# Patient Record
Sex: MALE | Race: WHITE | NOT HISPANIC OR LATINO | Employment: UNEMPLOYED | URBAN - METROPOLITAN AREA
[De-identification: names, ages, dates, MRNs, and addresses within clinical notes are randomized per-mention and may not be internally consistent; named-entity substitution may affect disease eponyms.]

---

## 2021-03-08 ENCOUNTER — HOSPITAL ENCOUNTER (INPATIENT)
Facility: CLINIC | Age: 56
LOS: 1 days | Discharge: HOME | DRG: 897 | End: 2021-03-10
Attending: EMERGENCY MEDICINE | Admitting: PSYCHIATRY & NEUROLOGY
Payer: COMMERCIAL

## 2021-03-08 ENCOUNTER — TELEPHONE (OUTPATIENT)
Dept: BEHAVIORAL HEALTH | Facility: CLINIC | Age: 56
End: 2021-03-08

## 2021-03-08 DIAGNOSIS — Z20.822 COVID-19 RULED OUT BY LABORATORY TESTING: ICD-10-CM

## 2021-03-08 DIAGNOSIS — F10.20 UNCOMPLICATED ALCOHOL DEPENDENCE (H): ICD-10-CM

## 2021-03-08 LAB
ALBUMIN SERPL-MCNC: 4.4 G/DL (ref 3.4–5)
ALCOHOL BREATH TEST: 0 (ref 0–0.01)
ALP SERPL-CCNC: 100 U/L (ref 40–150)
ALT SERPL W P-5'-P-CCNC: 38 U/L (ref 0–70)
AMPHETAMINES UR QL SCN: NEGATIVE
ANION GAP SERPL CALCULATED.3IONS-SCNC: 7 MMOL/L (ref 3–14)
AST SERPL W P-5'-P-CCNC: 28 U/L (ref 0–45)
BARBITURATES UR QL: NEGATIVE
BASOPHILS # BLD AUTO: 0 10E9/L (ref 0–0.2)
BASOPHILS NFR BLD AUTO: 0.4 %
BENZODIAZ UR QL: NEGATIVE
BILIRUB SERPL-MCNC: 0.2 MG/DL (ref 0.2–1.3)
BUN SERPL-MCNC: 19 MG/DL (ref 7–30)
CALCIUM SERPL-MCNC: 9.1 MG/DL (ref 8.5–10.1)
CANNABINOIDS UR QL SCN: NEGATIVE
CHLORIDE SERPL-SCNC: 104 MMOL/L (ref 94–109)
CO2 SERPL-SCNC: 25 MMOL/L (ref 20–32)
COCAINE UR QL: NEGATIVE
CREAT SERPL-MCNC: 0.86 MG/DL (ref 0.66–1.25)
DIFFERENTIAL METHOD BLD: ABNORMAL
EOSINOPHIL # BLD AUTO: 0.2 10E9/L (ref 0–0.7)
EOSINOPHIL NFR BLD AUTO: 2.8 %
ERYTHROCYTE [DISTWIDTH] IN BLOOD BY AUTOMATED COUNT: 11.5 % (ref 10–15)
ETHANOL UR QL SCN: NEGATIVE
GFR SERPL CREATININE-BSD FRML MDRD: >90 ML/MIN/{1.73_M2}
GLUCOSE SERPL-MCNC: 85 MG/DL (ref 70–99)
HCT VFR BLD AUTO: 43.1 % (ref 40–53)
HGB BLD-MCNC: 14.8 G/DL (ref 13.3–17.7)
IMM GRANULOCYTES # BLD: 0 10E9/L (ref 0–0.4)
IMM GRANULOCYTES NFR BLD: 0.4 %
LABORATORY COMMENT REPORT: NORMAL
LYMPHOCYTES # BLD AUTO: 1.5 10E9/L (ref 0.8–5.3)
LYMPHOCYTES NFR BLD AUTO: 22 %
MCH RBC QN AUTO: 33.4 PG (ref 26.5–33)
MCHC RBC AUTO-ENTMCNC: 34.3 G/DL (ref 31.5–36.5)
MCV RBC AUTO: 97 FL (ref 78–100)
MONOCYTES # BLD AUTO: 0.6 10E9/L (ref 0–1.3)
MONOCYTES NFR BLD AUTO: 9 %
NEUTROPHILS # BLD AUTO: 4.4 10E9/L (ref 1.6–8.3)
NEUTROPHILS NFR BLD AUTO: 65.4 %
NRBC # BLD AUTO: 0 10*3/UL
NRBC BLD AUTO-RTO: 0 /100
OPIATES UR QL SCN: NEGATIVE
PLATELET # BLD AUTO: 242 10E9/L (ref 150–450)
POTASSIUM SERPL-SCNC: 3.8 MMOL/L (ref 3.4–5.3)
PROT SERPL-MCNC: 7.8 G/DL (ref 6.8–8.8)
RBC # BLD AUTO: 4.43 10E12/L (ref 4.4–5.9)
SARS-COV-2 RNA RESP QL NAA+PROBE: NEGATIVE
SODIUM SERPL-SCNC: 136 MMOL/L (ref 133–144)
SPECIMEN SOURCE: NORMAL
WBC # BLD AUTO: 6.7 10E9/L (ref 4–11)

## 2021-03-08 PROCEDURE — 82075 ASSAY OF BREATH ETHANOL: CPT | Performed by: EMERGENCY MEDICINE

## 2021-03-08 PROCEDURE — 250N000013 HC RX MED GY IP 250 OP 250 PS 637: Performed by: EMERGENCY MEDICINE

## 2021-03-08 PROCEDURE — 80307 DRUG TEST PRSMV CHEM ANLYZR: CPT | Performed by: EMERGENCY MEDICINE

## 2021-03-08 PROCEDURE — HZ2ZZZZ DETOXIFICATION SERVICES FOR SUBSTANCE ABUSE TREATMENT: ICD-10-PCS | Performed by: PSYCHIATRY & NEUROLOGY

## 2021-03-08 PROCEDURE — U0003 INFECTIOUS AGENT DETECTION BY NUCLEIC ACID (DNA OR RNA); SEVERE ACUTE RESPIRATORY SYNDROME CORONAVIRUS 2 (SARS-COV-2) (CORONAVIRUS DISEASE [COVID-19]), AMPLIFIED PROBE TECHNIQUE, MAKING USE OF HIGH THROUGHPUT TECHNOLOGIES AS DESCRIBED BY CMS-2020-01-R: HCPCS | Performed by: EMERGENCY MEDICINE

## 2021-03-08 PROCEDURE — C9803 HOPD COVID-19 SPEC COLLECT: HCPCS | Performed by: EMERGENCY MEDICINE

## 2021-03-08 PROCEDURE — 85025 COMPLETE CBC W/AUTO DIFF WBC: CPT | Performed by: EMERGENCY MEDICINE

## 2021-03-08 PROCEDURE — 80053 COMPREHEN METABOLIC PANEL: CPT | Performed by: EMERGENCY MEDICINE

## 2021-03-08 PROCEDURE — 80320 DRUG SCREEN QUANTALCOHOLS: CPT | Performed by: EMERGENCY MEDICINE

## 2021-03-08 PROCEDURE — 99284 EMERGENCY DEPT VISIT MOD MDM: CPT | Performed by: EMERGENCY MEDICINE

## 2021-03-08 PROCEDURE — 99285 EMERGENCY DEPT VISIT HI MDM: CPT | Mod: 25 | Performed by: EMERGENCY MEDICINE

## 2021-03-08 PROCEDURE — U0005 INFEC AGEN DETEC AMPLI PROBE: HCPCS | Performed by: EMERGENCY MEDICINE

## 2021-03-08 RX ORDER — LORAZEPAM 1 MG/1
1 TABLET ORAL 2 TIMES DAILY PRN
Status: ON HOLD | COMMUNITY
End: 2021-03-10

## 2021-03-08 RX ORDER — FOLIC ACID 1 MG/1
1 TABLET ORAL ONCE
Status: COMPLETED | OUTPATIENT
Start: 2021-03-08 | End: 2021-03-08

## 2021-03-08 RX ORDER — LORATADINE 10 MG/1
10 TABLET ORAL DAILY
COMMUNITY
End: 2023-01-11

## 2021-03-08 RX ORDER — LANOLIN ALCOHOL/MO/W.PET/CERES
100 CREAM (GRAM) TOPICAL ONCE
Status: COMPLETED | OUTPATIENT
Start: 2021-03-08 | End: 2021-03-08

## 2021-03-08 RX ORDER — MULTIVITAMIN,THERAPEUTIC
1 TABLET ORAL ONCE
Status: COMPLETED | OUTPATIENT
Start: 2021-03-08 | End: 2021-03-08

## 2021-03-08 RX ORDER — DIAZEPAM 5 MG
5 TABLET ORAL ONCE
Status: COMPLETED | OUTPATIENT
Start: 2021-03-08 | End: 2021-03-08

## 2021-03-08 RX ORDER — BUPROPION HYDROCHLORIDE 150 MG/1
150 TABLET ORAL DAILY
Status: ON HOLD | COMMUNITY
End: 2021-03-10

## 2021-03-08 RX ORDER — SERTRALINE HYDROCHLORIDE 100 MG/1
200 TABLET, FILM COATED ORAL DAILY
COMMUNITY
End: 2023-01-11

## 2021-03-08 RX ORDER — NICOTINE 21 MG/24HR
1 PATCH, TRANSDERMAL 24 HOURS TRANSDERMAL ONCE
Status: DISCONTINUED | OUTPATIENT
Start: 2021-03-08 | End: 2021-03-09 | Stop reason: DRUGHIGH

## 2021-03-08 RX ORDER — MAGNESIUM OXIDE 400 MG/1
800 TABLET ORAL ONCE
Status: COMPLETED | OUTPATIENT
Start: 2021-03-08 | End: 2021-03-08

## 2021-03-08 RX ORDER — DIAZEPAM 5 MG
5-20 TABLET ORAL EVERY 30 MIN PRN
Status: DISCONTINUED | OUTPATIENT
Start: 2021-03-08 | End: 2021-03-09

## 2021-03-08 RX ADMIN — NICOTINE 1 PATCH: 21 PATCH, EXTENDED RELEASE TRANSDERMAL at 21:53

## 2021-03-08 RX ADMIN — DIAZEPAM 5 MG: 5 TABLET ORAL at 19:07

## 2021-03-08 RX ADMIN — FOLIC ACID 1 MG: 1 TABLET ORAL at 19:07

## 2021-03-08 RX ADMIN — THERA TABS 1 TABLET: TAB at 19:07

## 2021-03-08 RX ADMIN — DIAZEPAM 5 MG: 5 TABLET ORAL at 23:02

## 2021-03-08 RX ADMIN — Medication 800 MG: at 19:07

## 2021-03-08 RX ADMIN — THIAMINE HCL TAB 100 MG 100 MG: 100 TAB at 19:07

## 2021-03-08 ASSESSMENT — ENCOUNTER SYMPTOMS
HYPERACTIVE: 1
SEIZURES: 0

## 2021-03-08 ASSESSMENT — ACTIVITIES OF DAILY LIVING (ADL)
LAUNDRY: WITH SUPERVISION
ORAL_HYGIENE: INDEPENDENT
HYGIENE/GROOMING: INDEPENDENT
DRESS: INDEPENDENT

## 2021-03-08 ASSESSMENT — MIFFLIN-ST. JEOR: SCORE: 1635.04

## 2021-03-09 PROBLEM — F10.20 UNCOMPLICATED ALCOHOL DEPENDENCE (H): Status: ACTIVE | Noted: 2021-03-09

## 2021-03-09 LAB
CHOLEST SERPL-MCNC: 207 MG/DL
GGT SERPL-CCNC: 26 U/L (ref 0–75)
HDLC SERPL-MCNC: 85 MG/DL
LDLC SERPL CALC-MCNC: 98 MG/DL
NONHDLC SERPL-MCNC: 122 MG/DL
TRIGL SERPL-MCNC: 122 MG/DL
TSH SERPL DL<=0.005 MIU/L-ACNC: 1.49 MU/L (ref 0.4–4)
VIT B12 SERPL-MCNC: 388 PG/ML (ref 193–986)

## 2021-03-09 PROCEDURE — 128N000004 HC R&B CD ADULT

## 2021-03-09 PROCEDURE — 80061 LIPID PANEL: CPT | Performed by: PSYCHIATRY & NEUROLOGY

## 2021-03-09 PROCEDURE — 36415 COLL VENOUS BLD VENIPUNCTURE: CPT | Performed by: PSYCHIATRY & NEUROLOGY

## 2021-03-09 PROCEDURE — 250N000011 HC RX IP 250 OP 636: Performed by: PSYCHIATRY & NEUROLOGY

## 2021-03-09 PROCEDURE — 82607 VITAMIN B-12: CPT | Performed by: PSYCHIATRY & NEUROLOGY

## 2021-03-09 PROCEDURE — 250N000013 HC RX MED GY IP 250 OP 250 PS 637: Performed by: PSYCHIATRY & NEUROLOGY

## 2021-03-09 PROCEDURE — 84443 ASSAY THYROID STIM HORMONE: CPT | Performed by: PSYCHIATRY & NEUROLOGY

## 2021-03-09 PROCEDURE — 99222 1ST HOSP IP/OBS MODERATE 55: CPT | Performed by: PHYSICIAN ASSISTANT

## 2021-03-09 PROCEDURE — 93005 ELECTROCARDIOGRAM TRACING: CPT

## 2021-03-09 PROCEDURE — 82977 ASSAY OF GGT: CPT | Performed by: PSYCHIATRY & NEUROLOGY

## 2021-03-09 PROCEDURE — 93010 ELECTROCARDIOGRAM REPORT: CPT | Performed by: INTERNAL MEDICINE

## 2021-03-09 PROCEDURE — 99223 1ST HOSP IP/OBS HIGH 75: CPT | Mod: AI | Performed by: PSYCHIATRY & NEUROLOGY

## 2021-03-09 PROCEDURE — 99207 PR CONSULT E&M CHANGED TO INITIAL LEVEL: CPT | Performed by: PHYSICIAN ASSISTANT

## 2021-03-09 RX ORDER — LORATADINE 10 MG/1
10 TABLET ORAL DAILY
Status: DISCONTINUED | OUTPATIENT
Start: 2021-03-09 | End: 2021-03-09

## 2021-03-09 RX ORDER — FOLIC ACID 1 MG/1
1 TABLET ORAL DAILY
Status: DISCONTINUED | OUTPATIENT
Start: 2021-03-09 | End: 2021-03-10 | Stop reason: HOSPADM

## 2021-03-09 RX ORDER — MAGNESIUM HYDROXIDE/ALUMINUM HYDROXICE/SIMETHICONE 120; 1200; 1200 MG/30ML; MG/30ML; MG/30ML
30 SUSPENSION ORAL EVERY 4 HOURS PRN
Status: DISCONTINUED | OUTPATIENT
Start: 2021-03-08 | End: 2021-03-10 | Stop reason: HOSPADM

## 2021-03-09 RX ORDER — AMOXICILLIN 250 MG
1 CAPSULE ORAL 2 TIMES DAILY PRN
Status: DISCONTINUED | OUTPATIENT
Start: 2021-03-08 | End: 2021-03-10 | Stop reason: HOSPADM

## 2021-03-09 RX ORDER — DIAZEPAM 5 MG
5-20 TABLET ORAL EVERY 30 MIN PRN
Status: DISCONTINUED | OUTPATIENT
Start: 2021-03-08 | End: 2021-03-10 | Stop reason: HOSPADM

## 2021-03-09 RX ORDER — ACETAMINOPHEN 325 MG/1
650 TABLET ORAL EVERY 4 HOURS PRN
Status: DISCONTINUED | OUTPATIENT
Start: 2021-03-08 | End: 2021-03-10 | Stop reason: HOSPADM

## 2021-03-09 RX ORDER — LOPERAMIDE HCL 2 MG
2 CAPSULE ORAL 4 TIMES DAILY PRN
Status: DISCONTINUED | OUTPATIENT
Start: 2021-03-09 | End: 2021-03-10 | Stop reason: HOSPADM

## 2021-03-09 RX ORDER — POLYETHYLENE GLYCOL 3350 17 G
2 POWDER IN PACKET (EA) ORAL
Status: DISCONTINUED | OUTPATIENT
Start: 2021-03-09 | End: 2021-03-10 | Stop reason: HOSPADM

## 2021-03-09 RX ORDER — LORATADINE 10 MG/1
10 TABLET ORAL DAILY PRN
Status: DISCONTINUED | OUTPATIENT
Start: 2021-03-09 | End: 2021-03-10 | Stop reason: HOSPADM

## 2021-03-09 RX ORDER — VITAMIN B COMPLEX
25 TABLET ORAL DAILY
Status: DISCONTINUED | OUTPATIENT
Start: 2021-03-09 | End: 2021-03-10 | Stop reason: HOSPADM

## 2021-03-09 RX ORDER — PHENOBARBITAL 32.4 MG/1
32.4 TABLET ORAL AT BEDTIME
Status: DISCONTINUED | OUTPATIENT
Start: 2021-03-09 | End: 2021-03-10 | Stop reason: HOSPADM

## 2021-03-09 RX ORDER — NICOTINE 21 MG/24HR
1 PATCH, TRANSDERMAL 24 HOURS TRANSDERMAL DAILY
Status: DISCONTINUED | OUTPATIENT
Start: 2021-03-09 | End: 2021-03-10 | Stop reason: HOSPADM

## 2021-03-09 RX ORDER — SERTRALINE HYDROCHLORIDE 100 MG/1
200 TABLET, FILM COATED ORAL DAILY
Status: DISCONTINUED | OUTPATIENT
Start: 2021-03-09 | End: 2021-03-10 | Stop reason: HOSPADM

## 2021-03-09 RX ORDER — LANOLIN ALCOHOL/MO/W.PET/CERES
100 CREAM (GRAM) TOPICAL DAILY
Status: DISCONTINUED | OUTPATIENT
Start: 2021-03-09 | End: 2021-03-10 | Stop reason: HOSPADM

## 2021-03-09 RX ORDER — IBUPROFEN 600 MG/1
600 TABLET, FILM COATED ORAL EVERY 6 HOURS PRN
Status: DISCONTINUED | OUTPATIENT
Start: 2021-03-09 | End: 2021-03-10 | Stop reason: HOSPADM

## 2021-03-09 RX ORDER — MULTIPLE VITAMINS W/ MINERALS TAB 9MG-400MCG
1 TAB ORAL DAILY
Status: DISCONTINUED | OUTPATIENT
Start: 2021-03-09 | End: 2021-03-10 | Stop reason: HOSPADM

## 2021-03-09 RX ORDER — ATENOLOL 50 MG/1
50 TABLET ORAL DAILY PRN
Status: DISCONTINUED | OUTPATIENT
Start: 2021-03-08 | End: 2021-03-10 | Stop reason: HOSPADM

## 2021-03-09 RX ORDER — HYDROXYZINE HYDROCHLORIDE 25 MG/1
25 TABLET, FILM COATED ORAL EVERY 4 HOURS PRN
Status: DISCONTINUED | OUTPATIENT
Start: 2021-03-08 | End: 2021-03-10 | Stop reason: HOSPADM

## 2021-03-09 RX ORDER — ONDANSETRON 4 MG/1
4 TABLET, ORALLY DISINTEGRATING ORAL EVERY 6 HOURS PRN
Status: DISCONTINUED | OUTPATIENT
Start: 2021-03-09 | End: 2021-03-10 | Stop reason: HOSPADM

## 2021-03-09 RX ADMIN — ACETAMINOPHEN 650 MG: 325 TABLET, FILM COATED ORAL at 22:05

## 2021-03-09 RX ADMIN — SERTRALINE HYDROCHLORIDE 200 MG: 100 TABLET ORAL at 08:47

## 2021-03-09 RX ADMIN — ONDANSETRON 4 MG: 4 TABLET, ORALLY DISINTEGRATING ORAL at 16:19

## 2021-03-09 RX ADMIN — ONDANSETRON 4 MG: 4 TABLET, ORALLY DISINTEGRATING ORAL at 08:47

## 2021-03-09 RX ADMIN — HYDROXYZINE HYDROCHLORIDE 25 MG: 25 TABLET, FILM COATED ORAL at 12:26

## 2021-03-09 RX ADMIN — DIAZEPAM 10 MG: 5 TABLET ORAL at 01:53

## 2021-03-09 RX ADMIN — NICOTINE 1 PATCH: 14 PATCH, EXTENDED RELEASE TRANSDERMAL at 08:46

## 2021-03-09 RX ADMIN — NICOTINE 1 PATCH: 14 PATCH, EXTENDED RELEASE TRANSDERMAL at 17:10

## 2021-03-09 RX ADMIN — HYDROXYZINE HYDROCHLORIDE 25 MG: 25 TABLET, FILM COATED ORAL at 22:05

## 2021-03-09 RX ADMIN — ACETAMINOPHEN 650 MG: 325 TABLET, FILM COATED ORAL at 12:26

## 2021-03-09 ASSESSMENT — ACTIVITIES OF DAILY LIVING (ADL)
HYGIENE/GROOMING: INDEPENDENT
LAUNDRY: WITH SUPERVISION
ORAL_HYGIENE: INDEPENDENT
LAUNDRY: WITH SUPERVISION
HYGIENE/GROOMING: INDEPENDENT
DRESS: INDEPENDENT
ORAL_HYGIENE: INDEPENDENT
DRESS: INDEPENDENT

## 2021-03-09 NOTE — PLAN OF CARE
Late entry for [id number] shift on 3-8-2021  [facility]      Gus Brown is a 56 year old year old male with a chief complaint of Alcohol problem      S = Situation:   Patient is a voluntary admission seeking alcohol detox      B  = Background:   Patient admitted from the ER seeking alcohol detox. Patient stated he was drinking on average 10 beers daily for the past 2 years.Last drink was 3/7/21. Patient also takes prescribed Lorazepam 1 mg BID prn. Patient usually takes this at night for sleep, last taken 3/7/21. Patient breathalyzer 0 and urine drug screen negative for any other substances. Patient has never been through alcohol detox, and denies any history of seizures or DT's. Patient has never been hospitalized for mental health reasons. Patient reports a past medical history of IBS, and sensitivity to gluten and milk (not other dairy products), depression and anxiety. Patient also reports his pulse generally runs low.     A  =  Assessment:   Patient affect flat, mood is calm. Patient denies and SI, SIB, HI, or hallucinations. Patient reports stressors leading to drinking more alcohol include a divorce 2 years ago, and isolation due to COVID. 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 Patient identifies his Taoism practice as a positive and misses meeting with his Taoism friends. Patient also states his daughter is a source of support, as well as his family in [place]. Patient also reports sleep problems and reports melatonin and trazodone have both been unhelpful. MSSA 8 during admission assessment. Patient reports he is interested in OP CD treatment.    R =   Request or Recommendation:   Patient on MSSA monitoring with Valium for alcohol withdrawal. Patient to be assessed in the am by psychiatrist, internal medicine, and case management.

## 2021-03-09 NOTE — H&P
Gus Brown is a 56 year old male who was referred by self      History was provided by PATEINT who was a good historian.   CHIEF COMPLAINT:  Drinking everyday     HISTORY OF PRESENT ILLNESS:      Patient is a 56-year-old  male came to the emergency room wanting detox from alcohol and Ativan.  Daily drinking for 2 yrs   Patient has been using the following substances: Alcohol  Started at age15 , became daily by 2019. He states that he gets jittery and withdrawal symptoms when he stops drinking and states he pretty much drinks around the clock.  He drinks 10 beers a day .  He expericens crawling feeling , tremor,sweaty , agitated when he stops drinking    Patient has tolerance, withdrawal, progressive use, loss of control, spending more time and more amount than intended. Patient has made attempts to quit, is experiencing cravings, and reports negative consequences.  Patient does not have a history of seizures.  Patient does not have a history of delirium tremens.       He was prescribed ativan 2yrs ago for anxiety 0.5-1mg as prescribed for anxiety he has been drinking on it        Denies thoughts of suicide or harming others.      Denies auditory or visual hallucinations.     Patient smokes1/2 ppd    Patient denied any gambling    Substance Age first use First became regular or problematic Most recent use   Alcohol         Cannabis  occasionaly    Cocaine NONE       Stimulants NONE       Opioids NONE       Sedatives NONE       Hallucinogens NONE       Inhalants NONE       Other         OTC drugs NONE       Nicotine           PSYCHIATRIC REVIEW OF SYSTEMS:         Psychiatric Review of Systems:   Depression:   H/o depression   Denies: depressed mood, suicidal ideation, decreased interest, changes in sleep, changes in appetite, guilt, hopelessness, helplessness, impaired concentration, decreased energy, irritability.  Rita:   Denied  sleeplessness, impulsiveness, racing thoughts, increased  goal-directed activities, pressured speech, increase in energy  Rita Feeling euphoric,Distractible,Impulsive,Grandiose,Talking excessively,Have energy without sleeping,Mood swings,Irritability  Denies: sleeplessness, increased goal-directed activities, abrupt increase in energy, pressured speech  Psychosis:     Denies: visual hallucinations, auditory hallucinations, paranoia  Anxiety: c/o restless, crawling sensation  C/o palpitation agitation wound up feeling , tingly , resltess         PTSD:     Denies: re-experiencing past trauma, nightmares, increased arousal, avoidance of traumatic stimuli, impaired function.  OCD:     Denies: obsessions, checking, symmetry, cleaning, skin picking.  ED:     Denies: restriction, binging, purging.    H/o add, dificulty  finisihing thing   staying on task                   PSYCHIATRIC HISTORY   Was in therapy for depression             Past court commitments: none  SIB /SUICIDE ATTEMPTS NONE  Psych Hosp :none  Outpatient Programs none  Inpatient cd trt none  Out pt cd trt none    PAST PSYCH MED TRIALS   celexa  wellbutrin       SOCIAL HISTORY                                                                         Unemployed , single, 2 kids         Family History:   Unknown family psychiatric or chemical dependency issues             PTA Medications:     Medications Prior to Admission   Medication Sig Dispense Refill Last Dose     buPROPion (WELLBUTRIN XL) 150 MG 24 hr tablet Take 150 mg by mouth daily   3/8/2021 at Unknown time     cholecalciferol (VITAMIN D3) 125 mcg (5000 units) capsule Take 125 mcg by mouth daily   3/8/2021 at Unknown time     loratadine (CLARITIN) 10 MG tablet Take 10 mg by mouth daily   3/8/2021 at Unknown time     LORazepam (ATIVAN) 1 MG tablet Take 1 mg by mouth 2 times daily as needed for anxiety or sleep   3/8/2021 at Unknown time     Omega-3 Fatty Acids (FISH OIL OMEGA-3 PO) Take 1 capsule by mouth daily   3/8/2021 at Unknown time     Probiotic Product  "(PROBIOTIC DAILY PO) Take 1 capsule by mouth daily   3/8/2021 at Unknown time     S-Adenosylmethionine (JJ-E PO) Take 1 tablet by mouth daily   3/8/2021 at Unknown time     sertraline (ZOLOFT) 100 MG tablet Take 200 mg by mouth daily   3/8/2021 at Unknown time     THIAMINE HCL PO Take 1 tablet by mouth daily   3/8/2021 at Unknown time     VITAMIN D PO Take 1 tablet by mouth daily   3/8/2021 at Unknown time          Allergies:     Allergies   Allergen Reactions     Gluten Meal GI Disturbance     Dx of IBS; pt states \"gets bloated\"     Milk Protein Extract      Wheat Extract           Labs:     Recent Results (from the past 48 hour(s))   Alcohol breath test POCT    Collection Time: 03/08/21  4:26 PM   Result Value Ref Range    Alcohol Breath Test 0.000 0.00 - 0.01   CBC with platelets differential    Collection Time: 03/08/21  6:01 PM   Result Value Ref Range    WBC 6.7 4.0 - 11.0 10e9/L    RBC Count 4.43 4.4 - 5.9 10e12/L    Hemoglobin 14.8 13.3 - 17.7 g/dL    Hematocrit 43.1 40.0 - 53.0 %    MCV 97 78 - 100 fl    MCH 33.4 (H) 26.5 - 33.0 pg    MCHC 34.3 31.5 - 36.5 g/dL    RDW 11.5 10.0 - 15.0 %    Platelet Count 242 150 - 450 10e9/L    Diff Method Automated Method     % Neutrophils 65.4 %    % Lymphocytes 22.0 %    % Monocytes 9.0 %    % Eosinophils 2.8 %    % Basophils 0.4 %    % Immature Granulocytes 0.4 %    Nucleated RBCs 0 0 /100    Absolute Neutrophil 4.4 1.6 - 8.3 10e9/L    Absolute Lymphocytes 1.5 0.8 - 5.3 10e9/L    Absolute Monocytes 0.6 0.0 - 1.3 10e9/L    Absolute Eosinophils 0.2 0.0 - 0.7 10e9/L    Absolute Basophils 0.0 0.0 - 0.2 10e9/L    Abs Immature Granulocytes 0.0 0 - 0.4 10e9/L    Absolute Nucleated RBC 0.0    Comprehensive metabolic panel    Collection Time: 03/08/21  6:01 PM   Result Value Ref Range    Sodium 136 133 - 144 mmol/L    Potassium 3.8 3.4 - 5.3 mmol/L    Chloride 104 94 - 109 mmol/L    Carbon Dioxide 25 20 - 32 mmol/L    Anion Gap 7 3 - 14 mmol/L    Glucose 85 70 - 99 mg/dL    " "Urea Nitrogen 19 7 - 30 mg/dL    Creatinine 0.86 0.66 - 1.25 mg/dL    GFR Estimate >90 >60 mL/min/[1.73_m2]    GFR Estimate If Black >90 >60 mL/min/[1.73_m2]    Calcium 9.1 8.5 - 10.1 mg/dL    Bilirubin Total 0.2 0.2 - 1.3 mg/dL    Albumin 4.4 3.4 - 5.0 g/dL    Protein Total 7.8 6.8 - 8.8 g/dL    Alkaline Phosphatase 100 40 - 150 U/L    ALT 38 0 - 70 U/L    AST 28 0 - 45 U/L   Asymptomatic SARS-CoV-2 COVID-19 Virus (Coronavirus) by PCR    Collection Time: 03/08/21  6:01 PM    Specimen: Nasopharyngeal   Result Value Ref Range    SARS-CoV-2 Virus Specimen Source Nasopharyngeal     SARS-CoV-2 PCR Result NEGATIVE     SARS-CoV-2 PCR Comment       Testing was performed using the EmSenseert Xpress SARS-CoV-2 Assay on the Cepheid Gene-Xpert   Instrument Systems. Additional information about this Emergency Use Authorization (EUA)   assay can be found via the Lab Guide.     Drug abuse screen 6 urine (tox)    Collection Time: 03/08/21  6:06 PM   Result Value Ref Range    Amphetamine Qual Urine Negative NEG^Negative    Barbiturates Qual Urine Negative NEG^Negative    Benzodiazepine Qual Urine Negative NEG^Negative    Cannabinoids Qual Urine Negative NEG^Negative    Cocaine Qual Urine Negative NEG^Negative    Ethanol Qual Urine Negative NEG^Negative    Opiates Qualitative Urine Negative NEG^Negative   GGT    Collection Time: 03/09/21  6:33 AM   Result Value Ref Range    GGT 26 0 - 75 U/L   Lipid panel    Collection Time: 03/09/21  6:33 AM   Result Value Ref Range    Cholesterol 207 (H) <200 mg/dL    Triglycerides 122 <150 mg/dL    HDL Cholesterol 85 >39 mg/dL    LDL Cholesterol Calculated 98 <100 mg/dL    Non HDL Cholesterol 122 <130 mg/dL   TSH with free T4 reflex and/or T3 as indicated    Collection Time: 03/09/21  6:33 AM   Result Value Ref Range    TSH 1.49 0.40 - 4.00 mU/L         /76   Pulse 53   Temp 97.3  F (36.3  C) (Temporal)   Resp 16   Ht 1.778 m (5' 10\")   Wt 79.4 kg (175 lb)   SpO2 97%   BMI 25.11 kg/m  "   Weight is 175 lbs 0 oz  Body mass index is 25.11 kg/m .    Physical Exam:     ROS: 10 point ROS neg other than the symptoms noted above in the HPI.            Past Medical History:   PAST MEDICAL HISTORY: No past medical history on file.    PAST SURGICAL HISTORY: No past surgical history on file.    -    -           MENTAL STATUS EXAM:      Constitutional: General appearance of patient:      Appearance:  awake, alert, appeared as age stated, adequate groomed and slightly unkempt  Attitude:  cooperative  Eye Contact:  good  Mood:  anxious   Affect:  congruent   Speech:  clear, coherent normal rate   Psychomotor Behavior:  no evidence of tardive dyskinesia, dystonia, or tics  Thought Process:  logical, linear and goal oriented  Associations:  no loose associations  Thought Content:  no evidence of psychotic thought and active suicidal ideation present  Denied any active suicidal /homicidation ideation plan intent   Insight:  fair  Judgment:  fair  Oriented to:  time, person, and place  Attention Span and Concentration:  intact  Recent and Remote Memory:  intact  Language:  english with appropriate syntax and vocabulary  Fund of Knowledge: appropriate  Muscle Strength and Tone: normal  Gait and Station: Normal     There are no abnormal or psychotic thoughts, no preoccupations, no overvalued ideas, no rumination, no obsessions, no compulsions, no somatic concerns, no hypochrondriasis, no ideas of reference, and no delusions.  Patient denies homicidal thoughts.   Patient denies suicidal thoughts.  Patient appears to have good judgment and good insight.     Musculoskeletal: Patient shows no abnormalities of motor activity: there is no tremor, no tic, and no dystonia.  There is no apparent muscle atrophy, strength and tone appear normal, and there are no abnormal movements.  Patient has normal gait and stance.    DISCUSSION:         Assessment:   Inpatient psychiatric hospitalization is warranted at this time for safety,  stabilization, and possible adjustment in medications.      Psychologically patient is experiencing alcohol use disorder with tolerance withdrawal progress loss of control  Patient has these particular stressors work lack sober support  Patient has chronic illness exacerbation leading to hospitalization progression as described.     Patient has been unable to stop using drugs in the community due to both physical and psychological symptoms.  Continued use will put the patient at risk for medical and/or psychiatric complications.       Diagnoses:    Alcohol use disorder severe alcohol withdrawal severe  Alcohol dependence          Plan:   Problem list  1#alcohol use disorder severe alcohol withdrawal severe alcohol dependence     - [facility] protocol using Valium for management of alcohol withdrawal    - Continue thiamine, folate, and multivitamin daily  Patient has tremor agitation sweats he is required 20 mg of diazepam since his admission  2#patient's U tox is negative for benzodiazepines will have a small dose of phenobarbital to detox from Ativan    3#patient takes Wellbutrin will be held because it increases the risk of seizures we will restart once patient is out of detox  4#patient will continue Zoloft for his depression  5#.  Nicotine replacement  Pt interested in Dual Day OP program.  consult  ordered.  - Consider anti-craving medications prior to discharge. Pt willing to review additional information about both naltrexone and Antabuse.    Alcohol withdrawal nausea prn Zofran as needed for nausea     hydroxyzine 25 mg q4h prn for acute anxiety  Trazodone 50 mg at bedtime prn for sleep disturbances       Patient has been unable to stop using drugs in the community due to both physical and psychological symptoms.  Continued use will put the patient at risk for medical and/or psychiatric complications.    I HAVE REVIEWED LABS WITH PT AND TALKED ABOUT RESULTS WITH PT  I HAVE REVIEWED AND SUMMARIZED OLD RECORDS  including his medication reconcilation of his home medications  and PDMP   I HAVE SPOKEN WITH RN ABOUT MEDICATIONS AND DETOX SCORES  I HAVE SPOKEN WITH CM ABOUT PTS TREATMETN OPTIONS   Laboratory/Imaging:    Liver Function Studies -   Recent Labs   Lab Test 03/08/21  1801   PROTTOTAL 7.8   ALBUMIN 4.4   BILITOTAL 0.2   ALKPHOS 100   AST 28   ALT 38      Last Comprehensive Metabolic Panel:  Sodium   Date Value Ref Range Status   03/08/2021 136 133 - 144 mmol/L Final     Potassium   Date Value Ref Range Status   03/08/2021 3.8 3.4 - 5.3 mmol/L Final     Chloride   Date Value Ref Range Status   03/08/2021 104 94 - 109 mmol/L Final     Carbon Dioxide   Date Value Ref Range Status   03/08/2021 25 20 - 32 mmol/L Final     Anion Gap   Date Value Ref Range Status   03/08/2021 7 3 - 14 mmol/L Final     Glucose   Date Value Ref Range Status   03/08/2021 85 70 - 99 mg/dL Final     Urea Nitrogen   Date Value Ref Range Status   03/08/2021 19 7 - 30 mg/dL Final     Creatinine   Date Value Ref Range Status   03/08/2021 0.86 0.66 - 1.25 mg/dL Final     GFR Estimate   Date Value Ref Range Status   03/08/2021 >90 >60 mL/min/[1.73_m2] Final     Comment:     Non  GFR Calc  Starting 12/18/2018, serum creatinine based estimated GFR (eGFR) will be   calculated using the Chronic Kidney Disease Epidemiology Collaboration   (CKD-EPI) equation.       Calcium   Date Value Ref Range Status   03/08/2021 9.1 8.5 - 10.1 mg/dL Final     Bilirubin Total   Date Value Ref Range Status   03/08/2021 0.2 0.2 - 1.3 mg/dL Final     Alkaline Phosphatase   Date Value Ref Range Status   03/08/2021 100 40 - 150 U/L Final     ALT   Date Value Ref Range Status   03/08/2021 38 0 - 70 U/L Final     AST   Date Value Ref Range Status   03/08/2021 28 0 - 45 U/L Final                   Medical treatment/interventions:  Medical concerns: As above    - Consults: IM consult placed. Appreciate assistance.     Legal Status: Voluntary     Safety Assessment:  "  Checks: Status 15  Pt has not required locked seclusion or restraints in the past 24 hours to maintain safety, please refer to RN documentation for further details.    The risks, benefits, alternatives and side effects have been discussed and are understood by the patient.       Patient will be treated in therapeutic milieu with appropriate individual and group therapies as described.  Disposition: Pending clinical stabilization. Pt does  appear interested in        \"Much or all of the text in this note was generated through the use of Dragon Dictate voice to text software. Errors in spelling or words which appear to be out of contact are unintentional, may be present due having escaped editing\"     "

## 2021-03-09 NOTE — PROGRESS NOTES
Met with pt to initiate discharge planning.  Pt is requesting a referral to OP treatment.  Coached Pt to complete paperwork for assessment and referral.  Pt acknowledged. Pt has [facility].

## 2021-03-09 NOTE — PROGRESS NOTES
PDMP as of 3/9/2021:     Per donita Brown   Risk Indicators   NARX SCORES  Narcotic  140  Sedative  341  Stimulant  110  Explanation and Guidance  OVERDOSE RISK SCORE     170    (Range 000-999)  Explanation and Guidance  ADDITIONAL RISK INDICATORS ( 0 )     Explanation and Guidance   This [facility] report is based on search criteria supplied and the data entered by the dispensing pharmacy. For more information about any prescription, please contact the dispensing pharmacy or the prescriber. NarxCare scores and reports are intended to aid, not replace, medical decision making. None of the information presented should be used as sole justification for providing or refusing to provide medications. The information on this report is not warranted as accurate or complete.   Graphs   RX GRAPH  Narcotic Buprenorphine Sedative Stimulant Other     Prescribers    1 - Bertin Blairan    Timeline  03/09  2m  6m  1y  2y    Buprenorphine mg    16    4    0  28    Timeline  03/09  2m  6m  1y  2y  Morphine MgEq (MME)    200    80    0  320    Timeline  03/09  2m  6m  1y  2y  Lorazepam MgEq (LME)    10    2    0  18    Timeline  03/09  2m  6m  1y  2y    *Per CDC guidance, the MME conversion factors prescribed or provided as part of the medication-assisted treatment for opioid use disorder should not be used to benchmark against dosage thresholds meant for opioids prescribed for pain. Buprenorphine products have no agreed upon morphine equivalency, and as partial opioid agonists, are not expected to be associated with overdose risk in the same dose-dependent manner as doses for full agonist opioids. MME = morphine milligram equivalents. LME = Lorazepam milligram equivalents. mg = dose in milligrams.     Summary     Summary  Total Prescriptions:     19   Total Prescribers:     1   Total Pharmacies:     3   Narcotics* (excluding Buprenorphine)  Current Qty:     0   Current MME/day:     0.00  30 Day Avg MME/day:     0.00    Sedatives*  Current Qty:     18   Current LME/day:    2.00  30 Day Avg LME/day:    2.07  Buprenorphine*  Current Qty:     0   Current mg/day:    0.00  30 Day Avg mg/day:    0.00  Rx Data   PRESCRIPTIONS  Total Prescriptions:  19  Total Private Pay:  0  Fill Date  ID  Written  Sold  Drug  Qty  Days  Prescriber  Rx #  Pharmacy  Refill  Daily Dose *  Pymt Type    02/22/2021   1   01/07/2021 02/28/2021   Lorazepam 1 Mg Tablet   50.00  25  Ge Bal   [id number]   Sup (1172)   0/2  2.00 LME  Comm [facility]  01/29/2021   1   10/27/2020   02/01/2021   Lorazepam 1 Mg Tablet   50.00  25  Ge Bal   [id number]   Sup (1172)   2/2  2.00 LME  Comm [facility]  01/01/2021   1   10/27/2020   01/08/2021   Lorazepam 1 Mg Tablet   50.00  25  Ge Bal   [id number]   Sup (1172)   1/2  2.00 LME  Comm [facility]  12/09/2020   1   10/27/2020   12/11/2020   Lorazepam 1 Mg Tablet   50.00  25  Ge Bal   [id number]   Sup (1172)   0/2  2.00 LME  [place]  11/15/2020   1   09/28/2020 11/23/2020   Lorazepam 1 Mg Tablet   40.00  20  Ge Bal   [id number]   Sup (1172)   2/2  2.00 LME  Comm [facility]  10/19/2020   1   09/28/2020   10/30/2020   Lorazepam 1 Mg Tablet   40.00  20  Ge Bal   [id number]   Sup (1172)   1/2  2.00 LME  Comm [facility]  09/28/2020   1   09/28/2020   10/03/2020   Lorazepam 1 Mg Tablet   40.00  20  Ge Bal   [id number]   Sup (1172)   0/2  2.00 LME  Comm [facility]  09/03/2020   1   09/03/2020 09/04/2020   Vyvanse 20 Mg Capsule   30.00  30  Ge Bal   [id number]   Sup (1172)   0/0    Comm [facility]  08/04/2020   2   08/04/2020     Vyvanse 40 Mg Capsule   30.00  30  Ge Bal   [id number]   Gra (7941)   0/0    [place]  08/03/[place]   2   08/03/2020     Lorazepam 1 Mg Tablet   40.00  30  Ge Bal   [id number]   Gra (7941)   0/2  1.33 LME  [place]  07/06/2020   2   04/29/2020     Adderall Xr 30 Mg Capsule   30.00  30  Ge Bal   [id number]   Gra (7941)   0/0    [place]  06/29/2020   2   04/29/2020     Lorazepam 1 Mg Tablet   40.00  20  Ge Bal   [id number]   Gra  (7941)   2/2  2.00 LME  [place]  06/02/2020   2   04/29/2020     Lorazepam 1 Mg Tablet   40.00  20  Ge Bal   [id number]   Gra (7941)   1/2  2.00 LME  [place]  06/02/2020   2   04/29/2020     Adderall Xr 30 Mg Capsule   30.00  30  Ge Bal   [id number]   Gra (7941)   0/0    [place]  04/29/2020   2   04/29/2020     Lorazepam 1 Mg Tablet   40.00  20  Ge Bal   [id number]   Gra (7941)   0/2  2.00 LME  [place]  04/29/[place]   2   04/29/2020     Adderall Xr 30 Mg Capsule   30.00  30  Ge Bal   [id number]   Gra (7941)   0/0    [place]  03/18/2020   2   03/18/2020     Lorazepam 1 Mg Tablet   75.00  25  Ge Bal   [id number]   Gra (8599)   0/0  3.00 LME  [place]  03/18/[place]   2   03/18/2020     Eszopiclone 2 Mg Tablet   30.00  30  Ge Bal   [id number]   Gra (8599)   0/0  0.66 LME  [place]  03/18/2020   2   03/18/2020     Adderall Xr 30 Mg Capsule   30.00  30  Ge Bal   [id number]   Gra (8599)   0/0    [place]  *Per CDC guidance, the MME conversion factors prescribed or provided as part of the medication-assisted treatment for opioid use disorder should not be used to benchmark against dosage thresholds meant for opioids prescribed for pain. Buprenorphine products have no agreed upon morphine equivalency, and as partial opioid agonists, are not expected to be associated with overdose risk in the same dose-dependent manner as doses for full agonist opioids. MME = morphine milligram equivalents. LME = Lorazepam milligram equivalents. mg = dose in milligrams.     Providers  Total Providers: 1   Name   Address   City   State   Zipcode   Phone   Bertin Jung  [address]   [place]  [address] [phone]  Pharmacies  Total Pharmacies: 3   Name   Address   City   State   Zipcode   Phone   [facility] [phone]   [facility]  [address] [phone]  [facility] [phone]) [address] [place]  [place]  [address]  [phone]  [facility] [phone]  [address]  [phone]    The report provided is based upon the search criteria entered and the corresponding data as it has been reported by dispenser(s). If erroneous information is identified or additional information is needed, please contact the dispenser or the prescriber provided on the report. Date Sold signifies the date the prescription was sold (left the pharmacy). The absence of Date Sold does not necessarily indicate the prescription was not dispensed. Fill Date represents the date the medication was filled or prepared by the pharmacy. Note, federal regulation (CFR Title 42: Part 2) requires patient consent prior to releasing certain patient data from federally funded opioid treatment programs (OTPs). As such, controlled substances dispensed from OTPs for medication-assisted treatment may not appear in the [place]  report. Morphine milligram equivalent (MME) conversion factors published by the CDC are used in the MME calculation. Per the CDC, the MME conversion factor is intended only for analytic purposes where prescription data are used to retrospectively calculate daily MME to inform analyses of risks associated with opioid prescribing. This value does not constitute clinical guidance or recommendations for converting patients from one form of opioid analgesic to another. Per the CDC, the conversion factors for drugs prescribed or provided, as part of medication-assisted treatment for opioid use disorder should not be used to benchmark against MME dosage thresholds meant for opioids prescribed for pain. Buprenorphine products listed in the CDC s MME file do not have an associated conversion factor. Lastly, the CDC notes, in clinical practice, calculating MME for methadone often involves a sliding-scale approach, whereby the conversion factor increases with increasing dose. The conversion factor of 3 for methadone presented in this file could  underestimate MME for a given patient. This report contains confidential information, including patient identifiers, and is not a public record. The information on this report must be treated as protected health information and is only to be disclosed to others as authorized by applicable state and Federal regulations.           Powered By       [place] Prescription Monitoring Program  [place] Board of [employer]  [address] [place] [address] [address]  [place] [address]  [phone]     [employer] 2021. All Rights Reserved. Privacy Policy

## 2021-03-09 NOTE — CONSULTS
3/9/21  The writer spoke with the patient about treatment options. The writer discussed the details of the Dual Disorder Program with the patient. Patient reported he was not aware he would not be able to be on prescribed Ativan during the program and that the program was during the day. Patient reported he would like to continue with his prescribed Ativan as this is the only medication he has found to be helpful with his sleep. Patient reported he would also like to be able to work during the day. Patient reported he would have most interest in an evening IOP treatment program at this time. The writer recommended he fill out the paperwork that was provided by the unit  FAYE Thomas to complete a AUSTYN assessment. The writer told the patient she would follow up with the unit  to let her know that is what he would like to pursue. The writer answered all the patient's questions regarding treatment programs. Patient verbalized understanding and said he would do the paperwork that was provided to him and that he would also follow up with the  about options/resources.The writer will the close consult at this time as patient has interest in evening IOP treatment. If patient's desire changes please re-order consult at that time.       Crista Keene, MAGY, LADC

## 2021-03-09 NOTE — CONSULTS
Internal Medicine Initial Visit      Gus Brown MRN# [id number]   YOB: 1965 Age: 56 year old   Date of Admission: 3/8/2021  PCP: Giorgio Perry    Referring Provider: Behavioral Health - Phil Wang MD  Reason for Visit: General Medical Evaluation          Assessment and Recommendations:   Gus Brown is a 56 year old man with a history of alcohol use, benzodiazepine abuse, depression, anxiety, seasonal allergies and vitamin D deficiency who is admitted to station 3A with alcohol withdrawal and benzodiazepine use Internal Medicine consultation was ordered by Dr. Marks for general medical evaluation.       # Alcohol use disorder with acute withdrawal:  Patient has been drinking 10 beers +/- wine per day for the past 2-3 years. Denies history of ANTHONY or DTs. Last drink was midnight on 3/8. MSSA scores 1-8 thus far. Utox negative. Currently patient reports feeling anxiety and nausea which he attributes to his withdrawal symptoms.   - Management per psychiatry team  - Agree with MVI, folic acid and thiamine supplementation     # Benzodiazepine use:  Has been using Lorazepam 0.5-1mg daily for the past 2 years. Also drinking alcohol daily as above. Seeking treatment given his concern for addiction. He has been using his benzodiazepine for sleep predominantly. Follows with Psychiatry Dr. Mercedes as OP.   - Management per psychiatry team    # Elevated cholesterol:  Cholesterol level 207 this AM, otherwise lipid panel with HDL 85, LDL 98, Triglycerides 122.  - Recommend follow up with PCP within 7 days for repeat fasting lipid panel  - Lifestyle modificiations    # Vit D deficiency:  - Continue PTA cholecalciferol 125mcg daily    # Seasonal allergies:  - Continue PTA Loratadine 10mg daily PRN (reports he does not take this regularly)     # Depression:  On PTA Zoloft 200mg daily and Wellbutrin 150mg daily. Denies SI/HI.   - Management per psychiatry team    # Tobacco use  #  Cannabis use:  Smokes 0.5ppd. Smoked off and on since age 22yo. Also uses smokeless tobacco and marijuana occasionally. Denies any other illicit drug use.  - Encouraged cessation  - Nicotine supplementation per psychiatry team    # History of ulcerative proctitis:  Diagnosed on colonoscopy in 2014. Follows with Dr. Rausch, GI at [facility]. Last seen 2019. He reports that his symptoms are now controlled and he takes JJ-E supplementation daily. He denies any abdominal pain, diarrhea, constipation, melena or hematochezia.   - Follow-up with Gastroenterology as outpatient as needed  - Follow up with PCP as outpatient     # Asymptomatic bradycardia:  Reports his HR generally run 50-60s.  HR currently 45-53bpm. ECG obtained showing sinus bradycardia without evidence of heart block with HR 44bpm. He is asymptomatic and denies dizziness, lightheadedness, chest pain or SOB.   - Monitor HR - please notify medicine if symptomatic including dizziness, lightheadedness, chest pain or SOB or presyncope      Medicine will monitor HR peripherally, please page with any additional concerns.     Madeleine Payan PA-C  Hospitalist Service   Pager: [phone]  7a-6p M-F and 7a-3p weekends/holidays  Otherwise page job code [phone] (), [phone] (3A), or [phone] ([facility] and )  Text paging via [employer] is appreciated        Reason for Admission:   Alcohol withdrawal and benzodiazepine use         History of Present Illness:   History is obtained from the patient and medical record.     Gus Brown is a 56 year old man with a history of alcohol use, benzodiazepine abuse, seasonal allergies and vitamin D deficiency who is admitted to station 3A with alcohol withdrawal and benzodiazepine withdrawal. Internal Medicine consultation was ordered by Dr. Marks for general medical evaluation.    Patient reports drinking 10 beers plus or minus wine daily for the past ~2 years. He states during this time  "he has gotten  and has been unemployed (previously a  in schools). He denies any history of ANTHONY or DTs. Last drink midnight 3/8. He reports current anxiety and nausea which he attributes to his withdrawal. He had Valium last evening and he states that he felt, \"groggy,\" from it.    He also has been taking Ativan 0.5 to 1mg daily for the last few years prescribed by his psychiatrist. He notes he takes it before bed for sleep. He will occasionally take another dose for anxiety but gets 40 tablets per month so he normally doesn't take more than one per day.  He is also on Zoloft and Wellbutrin for depression. He denies any SI/HI. He does state he is \"scared,\" about his future if he doesn't get help with his alcohol use.     He smokes 0.5 ppd of cigarettes/day and has smoked off and on since his mid 20s. He also uses smokeless tobacco and marijuana occasionally. Denies any other illicit drug use.    He denies any chest pain, SOB, abdominal pain, diarrhea, constipation, melena, hematochezia, dysuria, frequency, dizziness or lightheadedness, urgency, fever, chills, chest pain or SOB. No paresthesias. He states he normally has HR in 50s-60s.         Review of Systems:    ROS: 10 point ROS neg other than the symptoms noted above in the HPI.            Past Medical History:   Reviewed and updated in Epic.  No past medical history on file.          Past Surgical History:   Reviewed and updated in Epic.  No past surgical history on file.          Social History:   L[place] an [place] in the [facility]. Currently unemployed. . Has children.   Social History     Tobacco Use     Smoking status: Not on file   Substance Use Topics     Alcohol use: Not on file     Drug use: Not on file             Family History:   Reviewed and updated in Epic.  No family history on file.          Allergies:     Allergies   Allergen Reactions     Gluten Meal GI Disturbance     Dx of IBS; pt states " "\"gets bloated\"     Milk Protein Extract      Wheat Extract              Medications:     Medications Prior to Admission   Medication Sig Dispense Refill Last Dose     buPROPion (WELLBUTRIN XL) 150 MG 24 hr tablet Take 150 mg by mouth daily   3/8/2021 at Unknown time     cholecalciferol (VITAMIN D3) 125 mcg (5000 units) capsule Take 125 mcg by mouth daily   3/8/2021 at Unknown time     loratadine (CLARITIN) 10 MG tablet Take 10 mg by mouth daily   3/8/2021 at Unknown time     LORazepam (ATIVAN) 1 MG tablet Take 1 mg by mouth 2 times daily as needed for anxiety or sleep   3/8/2021 at Unknown time     Omega-3 Fatty Acids (FISH OIL OMEGA-3 PO) Take 1 capsule by mouth daily   3/8/2021 at Unknown time     Probiotic Product (PROBIOTIC DAILY PO) Take 1 capsule by mouth daily   3/8/2021 at Unknown time     S-Adenosylmethionine (JJ-E PO) Take 1 tablet by mouth daily   3/8/2021 at Unknown time     sertraline (ZOLOFT) 100 MG tablet Take 200 mg by mouth daily   3/8/2021 at Unknown time     THIAMINE HCL PO Take 1 tablet by mouth daily   3/8/2021 at Unknown time     VITAMIN D PO Take 1 tablet by mouth daily   3/8/2021 at Unknown time        Current Facility-Administered Medications   Medication     acetaminophen (TYLENOL) tablet 650 mg     alum & mag hydroxide-simethicone (MAALOX) suspension 30 mL     atenolol (TENORMIN) tablet 50 mg     cholecalciferol (VITAMIN D3) 125 mcg (5000 units) capsule 125 mcg     diazepam (VALIUM) tablet 5-20 mg     folic acid (FOLVITE) tablet 1 mg     hydrOXYzine (ATARAX) tablet 25 mg     ibuprofen (ADVIL/MOTRIN) tablet 600 mg     loperamide (IMODIUM) capsule 2 mg     loratadine (CLARITIN) tablet 10 mg     multivitamin w/minerals (THERA-VIT-M) tablet 1 tablet     nicotine (NICODERM CQ) 14 MG/24HR 24 hr patch 1 patch     nicotine Patch in Place     ondansetron (ZOFRAN-ODT) ODT tab 4 mg     PHENobarbital (LUMINAL) tablet 32.4 mg     senna-docusate (SENOKOT-S/PERICOLACE) 8.6-50 MG per tablet 1 tablet     " "sertraline (ZOLOFT) tablet 200 mg     thiamine (B-1) tablet 100 mg     Vitamin D3 (CHOLECALCIFEROL) tablet 25 mcg            Physical Exam:   Blood pressure 118/76, pulse 53, temperature 97.3  F (36.3  C), temperature source Temporal, resp. rate 16, height 1.778 m (5' 10\"), weight 79.4 kg (175 lb), SpO2 97 %.  Body mass index is 25.11 kg/m .  GENERAL: Alert and oriented x 3. NAD. Sitting up in bed resting comfortably. Pleasant.   HEENT: Anicteric sclera. Mucous membranes moist.   CV: Bradycardic. S1, S2. No murmurs appreciated.   RESPIRATORY: Effort normal on room air. Lungs CTAB with no wheezing, rales, rhonchi.   GI: Abdomen soft, non distended, non tender. No guarding, rigidity or rebound tenderness.  MUSCULOSKELETAL: No joint swelling or tenderness.  NEUROLOGICAL: No focal deficits. Moves all extremities. No active hand tremors. Normal gait.   EXTREMITIES: No peripheral edema. Intact bilateral pedal pulses.   SKIN: No jaundice. No rashes.           Data:   CBC:  Recent Labs   Lab Test 03/08/21  1801   WBC 6.7   RBC 4.43   HGB 14.8   HCT 43.1   MCV 97   MCH 33.4*   MCHC 34.3   RDW 11.5          CMP:  Recent Labs   Lab Test 03/08/21  1801      POTASSIUM 3.8   CHLORIDE 104   DUSTY 9.1   CO2 25   BUN 19   CR 0.86   GLC 85   AST 28   ALT 38   BILITOTAL 0.2   ALBUMIN 4.4   PROTTOTAL 7.8   ALKPHOS 100       TSH:  TSH   Date Value Ref Range Status   03/09/2021 1.49 0.40 - 4.00 mU/L Final       Tox screen: negative    Unresulted Labs Ordered in the Past 30 Days of this Admission     Date and Time Order Name Status Description    3/9/2021 0001 Vitamin B12 In process              "

## 2021-03-09 NOTE — PLAN OF CARE
Behavioral Team Discussion: (3/9/2021)    Continued Stay Criteria/Rationale: Patient admitted for alcohol withdrawal, complicated.  Plan: The following services will be provided to the patient; psychiatric assessment, medication management, therapeutic milieu, individual and group support, and skills groups.   Participants: 3A Provider: Dr. Phil Wang MD; 3A RN's: Beena Bowens, RN; 3A CM's: Pat Gonzalez [facility], Negra Kaufman MA [facility] and Beverly Jacinto [facility]   Summary/Recommendation: Providers will assess today for treatment recommendations, discharge planning, and aftercare plans. CM will meet with pt for discharge planning.   Medical/Physical: Internal medicine consult to be completed 3/9/2021.  Precautions:   Behavioral Orders   Procedures     Code 1 - Restrict to Unit     Routine Programming     As clinically indicated     Status 15     Every 15 minutes.     Withdrawal precautions     Rationale for change in precautions or plan: N/A  Progress: No Change.

## 2021-03-09 NOTE — TELEPHONE ENCOUNTER
S: 7PM- ED MD called to request detox inpt for 56 yrs old male in the [place] ED.     B: Pt presents to the ED seeking detox from alcohol.  Pt reports that he drinks about 12 packs of beer per day for the last 2 years.  Last drink was midnight last night.  Pt drinks around the clock so he doesn't get shaky.  Pt denies any hx of w/d seizures or DTs.  Pt has never been to detox or had CD treatment.  Pt blew a 0 here.  Pt repots Pt also has been doing lorazepam in small doses daily.  PT gets ativan from his primary care provider.  Pt's UTOX is negative.  Pt has not told family he is here.  Pt reports a hx of anxiety.  Denies SI/HI.     Pt has no chronic medical illness.  He ambulates independently and is medically cleared.      Labs look good.     Pt has no symptoms of COVID.  COVID is pending.   UTOX: negative.  CBC: WNL  CMP: WNL  Breathalyzer: 0  Vitals: stable    A: Vol.  Wants to get help.      R: 7:53PM- Dr. Marks accepts for 3A/[place].      Unit notified.  ED can call at 10 for report.     ED notified via phone.          Patient cleared and ready for behavioral bed placement: Yes  
No

## 2021-03-09 NOTE — ED NOTES
ED to Behavioral Floor Handoff    SITUATION  Per Harshal Brown is a 56 year old male who speaks English and lives in a home alone The patient arrived in the ED by private car from home with a complaint of Drug / Alcohol Assessment (blew .000, seeking detox from alcohol, first detox, last drink was 12 MN, drinks daily, for the past 2 yrs has been drinking up to 10 bots of beer. Denies any seizure etoh withdrawal, no DT's, not DM. Denies SI/HI)  .The patient's current symptoms started/worsened 2 year(s) ago and during this time the symptoms have increased.   In the ED, pt was diagnosed with   Final diagnoses:   Uncomplicated alcohol dependence (H)        Initial vitals were: BP: 139/75  Pulse: 59  Temp: 98.9  F (37.2  C)  Resp: 14  Weight: 79.9 kg (176 lb 3.2 oz)  SpO2: 97 %   --------  Is the patient diabetic? No   If yes, last blood glucose? --     If yes, was this treated in the ED? --  --------  Is the patient inebriated (ETOH) No or Impaired on other substances? No  MSSA done? Yes  Last MSSA score: --    Were withdrawal symptoms treated? No  Does the patient have a seizure history? No. If yes, date of most recent seizure--  --------  Is the patient patient experiencing suicidal ideation? denies current or recent suicidal ideation     Homicidal ideation? denies current or recent homicidal ideation or behaviors.    Self-injurious behavior/urges? denies current or recent self injurious behavior or ideation.  ------  Was pt aggressive in the ED No  Was a code called No  Is the pt now cooperative? Yes  -------  Meds given in ED:   Medications   diazepam (VALIUM) tablet 5-20 mg (has no administration in time range)   diazepam (VALIUM) tablet 5 mg (5 mg Oral Given 3/8/21 1907)   multivitamin, therapeutic (THERA-VIT) tablet 1 tablet (1 tablet Oral Given 3/8/21 1907)   folic acid (FOLVITE) tablet 1 mg (1 mg Oral Given 3/8/21 1907)   thiamine (B-1) tablet 100 mg (100 mg Oral Given 3/8/21 1907)   magnesium oxide (MAG-OX)  "tablet 800 mg (800 mg Oral Given 3/8/21 1907)      Family present during ED course? No  Family currently present? No    BACKGROUND  Does the patient have a cognitive impairment or developmental disability? No  Allergies:   Allergies   Allergen Reactions     Gluten Meal GI Disturbance     Dx of IBS; pt states \"gets bloated\"     Milk Protein Extract      Wheat Extract    .   Social demographics are   Social History     Socioeconomic History     Marital status:      Spouse name: Not on file     Number of children: Not on file     Years of education: Not on file     Highest education level: Not on file   Occupational History     Not on file   Social Needs     Financial resource strain: Not on file     Food insecurity     Worry: Not on file     Inability: Not on file     Transportation needs     Medical: Not on file     Non-medical: Not on file   Tobacco Use     Smoking status: Not on file   Substance and Sexual Activity     Alcohol use: Not on file     Drug use: Not on file     Sexual activity: Not on file   Lifestyle     Physical activity     Days per week: Not on file     Minutes per session: Not on file     Stress: Not on file   Relationships     Social connections     Talks on phone: Not on file     Gets together: Not on file     Attends Denominational service: Not on file     Active member of club or organization: Not on file     Attends meetings of clubs or organizations: Not on file     Relationship status: Not on file     Intimate partner violence     Fear of current or ex partner: Not on file     Emotionally abused: Not on file     Physically abused: Not on file     Forced sexual activity: Not on file   Other Topics Concern     Not on file   Social History Narrative     Not on file        ASSESSMENT  Labs results   Labs Ordered and Resulted from Time of ED Arrival Up to the Time of Departure from the ED   CBC WITH PLATELETS DIFFERENTIAL - Abnormal; Notable for the following components:       Result Value    " MCH 33.4 (*)     All other components within normal limits   ALCOHOL BREATH TEST POCT - Normal   DRUG ABUSE SCREEN 6 CHEM DEP URINE ([facility])   COMPREHENSIVE METABOLIC PANEL   SARS-COV-2 (COVID-19) VIRUS RT-PCR   MSSA SCORE AND VS   NOTIFY      Imaging Studies: No results found for this or any previous visit (from the past 24 hour(s)).   Most recent vital signs /75   Pulse 59   Temp 98.9  F (37.2  C) (Oral)   Resp 14   Wt 79.9 kg (176 lb 3.2 oz)   SpO2 97%    Abnormal labs/tests/findings requiring intervention:---   Pain control: pt had none  Nausea control: pt had none    RECOMMENDATION  Are any infection precautions needed (MRSA, VRE, etc.)? No If yes, what infection? --  ---  Does the patient have mobility issues? independently. If yes, what device does the pt use? ---  ---  Is patient on 72 hour hold or commitment? No If on 72 hour hold, have hold and rights been given to patient? N/A  Are admitting orders written if after 10 p.m. ?N/A  Tasks needing to be completed:---     Gabby Gerber, RN    [phone]   [phone] [facility] ED

## 2021-03-09 NOTE — PROGRESS NOTES
03/08/21 1866   Patient Belongings   Did you bring any home meds/supplements to the hospital?  Yes   Disposition of meds  Sent to security/pharmacy per site process;Other (see comment)   Patient Belongings other (see comments)   Patient Belongings Put in Hospital Secure Location (Security or Locker, etc.) other (see comments)   Belongings Search Yes   Clothing Search Yes   Second Staff Juventino, RN   Comment See Notes     Large bin:  Suite case  Tobacco products  Jacket  Cap  Small bin:  Phone    Wallet  Security # [id number]  $40  Visa cards( [phone])  American Exp ([id number])  Red card(6247)  [place] D/L  A               Admission:  I am responsible for any personal items that are not sent to the safe or pharmacy.  [facility] is not responsible for loss, theft or damage of any property in my possession.    Signature:  _________________________________ Date: _______  Time: _____                                              Staff Signature:  ____________________________ Date: ________  Time: _____      2nd Staff person, if patient is unable/unwilling to sign:    Signature: ________________________________ Date: ________  Time: _____     Discharge:  [facility] has returned all of my personal belongings:    Signature: _________________________________ Date: ________  Time: _____                                          Staff Signature:  ____________________________ Date: ________  Time: _____

## 2021-03-09 NOTE — ED PROVIDER NOTES
[facility] EMERGENCY DEPARTMENT ([facility])  3/08/21  History     Chief Complaint   Patient presents with     Drug / Alcohol Assessment     lucille .000, seeking detox from alcohol, first detox, last drink was 12 MN, drinks daily, for the past 2 yrs has been drinking up to 10 bots of beer. Denies any seizure etoh withdrawal, no DT's, not DM. Denies SI/HI     The history is provided by the patient and medical records.   Drug / Alcohol Assessment      Per Harshal Brown is a 56 year old male who presents Emergency Department with complaints of chemical dependency issues.  Patient states he has been using lorazepam daily 1 to 1/2 mg, along with a significant amount of alcohol every day as well.  Patient states he has been doing this for the last 2 years and is worried that he now has become addicted and needs to get into a treatment program.  The patient states that he has had no homicidal or suicidal ideation and states that his last drink was at midnight.  Patient states that he gets jittery and withdrawal symptoms when he stops drinking and states he pretty much drinks around the clock.  Patient denies any alcohol withdrawal seizures, denies any DTs and states he is never had chemical denies a treatment or detox in the past.  Patient states he has not told any of his family members about this.    Patient has never been through treatment before and outpatient treatment was discussed but the patient felt he would be a poor candidate for outpatient treatment as he is very impulsive and cannot control his drinking.    This part of the medical record was transcribed by Nikita Buck Medical Scribe, from a dictation done by Merlin Dong MD.     No past medical history on file.    No past surgical history on file.    No family history on file.    Social History     Tobacco Use     Smoking status: Not on file   Substance Use Topics     Alcohol use: Not on file     No current facility-administered medications for  "this encounter.      Current Outpatient Medications   Medication     BUPROPION HCL PO     LORAZEPAM PO     SERTRALINE HCL PO        Allergies   Allergen Reactions     Gluten Meal GI Disturbance     Dx of IBS; pt states \"gets bloated\"     Milk Protein Extract      Wheat Extract          Review of Systems   Neurological: Negative for seizures.   Psychiatric/Behavioral: The patient is hyperactive.      A complete review of systems was performed with pertinent positives and negatives noted in the HPI, and all other systems negative.    Physical Exam   BP: 139/75  Pulse: 59  Temp: 98.9  F (37.2  C)  Resp: 14  Weight: 79.9 kg (176 lb 3.2 oz)  SpO2: 97 %  Physical Exam  Vitals signs and nursing note reviewed.   Constitutional:       Comments: Conversant pleasant slightly anxious   HENT:      Head: Atraumatic.   Eyes:      Extraocular Movements: Extraocular movements intact.      Pupils: Pupils are equal, round, and reactive to light.   Neck:      Musculoskeletal: Neck supple.   Cardiovascular:      Rate and Rhythm: Regular rhythm.   Pulmonary:      Breath sounds: Normal breath sounds.   Abdominal:      Palpations: Abdomen is soft.   Musculoskeletal:         General: No deformity.   Skin:     General: Skin is warm.   Neurological:      General: No focal deficit present.      Mental Status: He is alert and oriented to person, place, and time.      Comments: Grossly intact and symmetric   Psychiatric:         Mood and Affect: Mood normal.             ED Course      Procedures          Results for orders placed or performed during the hospital encounter of 03/08/21   Drug abuse screen 6 urine (tox)     Status: None   Result Value Ref Range    Amphetamine Qual Urine Negative NEG^Negative    Barbiturates Qual Urine Negative NEG^Negative    Benzodiazepine Qual Urine Negative NEG^Negative    Cannabinoids Qual Urine Negative NEG^Negative    Cocaine Qual Urine Negative NEG^Negative    Ethanol Qual Urine Negative NEG^Negative    " Opiates Qualitative Urine Negative NEG^Negative   CBC with platelets differential     Status: Abnormal   Result Value Ref Range    WBC 6.7 4.0 - 11.0 10e9/L    RBC Count 4.43 4.4 - 5.9 10e12/L    Hemoglobin 14.8 13.3 - 17.7 g/dL    Hematocrit 43.1 40.0 - 53.0 %    MCV 97 78 - 100 fl    MCH 33.4 (H) 26.5 - 33.0 pg    MCHC 34.3 31.5 - 36.5 g/dL    RDW 11.5 10.0 - 15.0 %    Platelet Count 242 150 - 450 10e9/L    Diff Method Automated Method     % Neutrophils 65.4 %    % Lymphocytes 22.0 %    % Monocytes 9.0 %    % Eosinophils 2.8 %    % Basophils 0.4 %    % Immature Granulocytes 0.4 %    Nucleated RBCs 0 0 /100    Absolute Neutrophil 4.4 1.6 - 8.3 10e9/L    Absolute Lymphocytes 1.5 0.8 - 5.3 10e9/L    Absolute Monocytes 0.6 0.0 - 1.3 10e9/L    Absolute Eosinophils 0.2 0.0 - 0.7 10e9/L    Absolute Basophils 0.0 0.0 - 0.2 10e9/L    Abs Immature Granulocytes 0.0 0 - 0.4 10e9/L    Absolute Nucleated RBC 0.0    Comprehensive metabolic panel     Status: None   Result Value Ref Range    Sodium 136 133 - 144 mmol/L    Potassium 3.8 3.4 - 5.3 mmol/L    Chloride 104 94 - 109 mmol/L    Carbon Dioxide 25 20 - 32 mmol/L    Anion Gap 7 3 - 14 mmol/L    Glucose 85 70 - 99 mg/dL    Urea Nitrogen 19 7 - 30 mg/dL    Creatinine 0.86 0.66 - 1.25 mg/dL    GFR Estimate >90 >60 mL/min/[1.73_m2]    GFR Estimate If Black >90 >60 mL/min/[1.73_m2]    Calcium 9.1 8.5 - 10.1 mg/dL    Bilirubin Total 0.2 0.2 - 1.3 mg/dL    Albumin 4.4 3.4 - 5.0 g/dL    Protein Total 7.8 6.8 - 8.8 g/dL    Alkaline Phosphatase 100 40 - 150 U/L    ALT 38 0 - 70 U/L    AST 28 0 - 45 U/L   Alcohol breath test POCT     Status: Normal   Result Value Ref Range    Alcohol Breath Test 0.000 0.00 - 0.01     Medications   diazepam (VALIUM) tablet 5-20 mg (has no administration in time range)   diazepam (VALIUM) tablet 5 mg (5 mg Oral Given 3/8/21 1907)   multivitamin, therapeutic (THERA-VIT) tablet 1 tablet (1 tablet Oral Given 3/8/21 1907)   folic acid (FOLVITE) tablet 1 mg  (1 mg Oral Given 3/8/21 1907)   thiamine (B-1) tablet 100 mg (100 mg Oral Given 3/8/21 1907)   magnesium oxide (MAG-OX) tablet 800 mg (800 mg Oral Given 3/8/21 1907)       Orders Placed This Encounter   Procedures     Drug abuse screen 6 urine (tox)     CBC with platelets differential     Comprehensive metabolic panel     Asymptomatic SARS-CoV-2 COVID-19 Virus (Coronavirus) by PCR     MSSA Score, Vital Signs, and Pain Assessment on admission and per protocol     Notify Physician - Alcohol Withdrawal MSSA focused     Alcohol breath test POCT         Assessments & Plan (with Medical Decision Making)     I have reviewed the nursing notes.    Case was discussed with intake and a bed was arranged for the patient in our detox area.    I have reviewed the findings, diagnosis, and plan with the patient.      Final diagnoses:   Uncomplicated alcohol dependence (H)     Merlin Dong MD, MD    --  Merlin Dong MD  [facility] EMERGENCY DEPARTMENT  3/8/2021     Merlin Dong MD  03/08/21 1911

## 2021-03-09 NOTE — PHARMACY-ADMISSION MEDICATION HISTORY
Admission Medication History Completed by Pharmacy    See [facility] Admission Navigator for allergy information, preferred outpatient pharmacy, prior to admission medications and immunization status.     Medication History Sources:     Patient, electronic fill history    Changes made to PTA medication list (reason):    Added: vitamin D, JJ-E, fish oil, probiotic, thiamine    Deleted: none    Changed: updated dose information for the bupropion, lorazepam, and sertraline (per pt & fill hx)    Additional Information:    Patient was unsure of the doses of his vitamins/supplements, so put in generic entries.     Prior to Admission medications    Medication Sig Last Dose Taking? Auth Provider   buPROPion (WELLBUTRIN XL) 150 MG 24 hr tablet Take 150 mg by mouth daily 3/8/2021 at Unknown time Yes Unknown, Entered By History   LORazepam (ATIVAN) 1 MG tablet Take 1 mg by mouth 2 times daily as needed for anxiety or sleep 3/8/2021 at Unknown time Yes Unknown, Entered By History   Omega-3 Fatty Acids (FISH OIL OMEGA-3 PO) Take 1 capsule by mouth daily 3/8/2021 at Unknown time Yes Unknown, Entered By History   Probiotic Product (PROBIOTIC DAILY PO) Take 1 capsule by mouth daily 3/8/2021 at Unknown time Yes Unknown, Entered By History   S-Adenosylmethionine (JJ-E PO) Take 1 tablet by mouth daily 3/8/2021 at Unknown time Yes Unknown, Entered By History   sertraline (ZOLOFT) 100 MG tablet Take 200 mg by mouth daily 3/8/2021 at Unknown time Yes Unknown, Entered By History   THIAMINE HCL PO Take 1 tablet by mouth daily 3/8/2021 at Unknown time Yes Unknown, Entered By History   VITAMIN D PO Take 1 tablet by mouth daily 3/8/2021 at Unknown time Yes Unknown, Entered By History       Date completed: 03/08/21    Medication history completed by: Cece Al, PharmD, BCPS

## 2021-03-10 VITALS
DIASTOLIC BLOOD PRESSURE: 72 MMHG | HEIGHT: 70 IN | WEIGHT: 175 LBS | OXYGEN SATURATION: 98 % | HEART RATE: 51 BPM | SYSTOLIC BLOOD PRESSURE: 115 MMHG | RESPIRATION RATE: 16 BRPM | TEMPERATURE: 97.8 F | BODY MASS INDEX: 25.05 KG/M2

## 2021-03-10 LAB — INTERPRETATION ECG - MUSE: NORMAL

## 2021-03-10 PROCEDURE — H0001 ALCOHOL AND/OR DRUG ASSESS: HCPCS

## 2021-03-10 PROCEDURE — 250N000013 HC RX MED GY IP 250 OP 250 PS 637: Performed by: PSYCHIATRY & NEUROLOGY

## 2021-03-10 PROCEDURE — 99239 HOSP IP/OBS DSCHRG MGMT >30: CPT | Performed by: PSYCHIATRY & NEUROLOGY

## 2021-03-10 RX ORDER — PHENOBARBITAL 32.4 MG/1
32.4 TABLET ORAL AT BEDTIME
Qty: 4 TABLET | Refills: 0 | Status: SHIPPED | OUTPATIENT
Start: 2021-03-10 | End: 2023-01-11

## 2021-03-10 RX ORDER — MULTIPLE VITAMINS W/ MINERALS TAB 9MG-400MCG
1 TAB ORAL DAILY
Qty: 30 TABLET | Refills: 0 | Status: SHIPPED | OUTPATIENT
Start: 2021-03-11 | End: 2023-01-11

## 2021-03-10 RX ORDER — NICOTINE 21 MG/24HR
1 PATCH, TRANSDERMAL 24 HOURS TRANSDERMAL DAILY
Qty: 30 PATCH | Refills: 0 | Status: SHIPPED | OUTPATIENT
Start: 2021-03-11 | End: 2023-01-11

## 2021-03-10 RX ADMIN — SERTRALINE HYDROCHLORIDE 200 MG: 100 TABLET ORAL at 08:35

## 2021-03-10 RX ADMIN — NICOTINE 1 PATCH: 14 PATCH, EXTENDED RELEASE TRANSDERMAL at 08:36

## 2021-03-10 ASSESSMENT — ACTIVITIES OF DAILY LIVING (ADL)
HYGIENE/GROOMING: INDEPENDENT
ORAL_HYGIENE: INDEPENDENT
DRESS: INDEPENDENT
LAUNDRY: WITH SUPERVISION

## 2021-03-10 NOTE — DISCHARGE SUMMARY
Gus Brown MRN# [id number]   Age: 56 year old YOB: 1965     Date of Admission:  3/8/2021  Date of Discharge:  3/10/2021  Admitting Physician:  Phil Wang MD  Discharge Physician:  Phil Wang MD      DISCHARGE  DX      Alcohol use disorder severe   Alcohol dependence             Event Leading to Hospitalization:     See Admission note by admitting provider for patient encounter. for additional details.          Hospital Course:   PATIENT was admitted to [facility]with attending  under DR wang, please review the detailed admit note on 3/9 /21   The patient was placed under status 15 (15 minute checks) to ensure patient safety.   MSSA protocol was initiated due to the patient's history of alcohol abuse and concern for withdrawal symptoms.  CBC, BMP and utox obtained.    All outpatient medications were continued    PATIENTdid participate in groups and was visible in the milieu.     The patient's symptoms of alcohol withdrawal  improved.     Patients energy motivation , sleep appetite improved.  Pt completed detox . It was un eventful.  Patient was prescribed phenobarbital to get off of the Ativan but at the time of discharge patient wants to continue taking Ativan    Discussed with patient medications for craving.  Spoke with patient about triggers coping skills relapse prevention.    CONSULTS DONE DURING PATIENTS HOSPITALIZATION.  Patient was seen by medicine on date 3/9/2021    This as per their medical consult           Assessment and Recommendations:   Gus Brown is a 56 year old man with a history of alcohol use, benzodiazepine abuse, depression, anxiety, seasonal allergies and vitamin D deficiency who is admitted to station 3[facility] with alcohol withdrawal and benzodiazepine use Internal Medicine consultation was ordered by Dr. Marks for general medical evaluation.       # Alcohol use disorder with acute withdrawal:  Patient has been drinking 10 beers +/-  wine per day for the past 2-3 years. Denies history of ANTHONY or DTs. Last drink was midnight on 3/8. MSSA scores 1-8 thus far. Utox negative. Currently patient reports feeling anxiety and nausea which he attributes to his withdrawal symptoms.   - Management per psychiatry team  - Agree with MVI, folic acid and thiamine supplementation      # Benzodiazepine use:  Has been using Lorazepam 0.5-1mg daily for the past 2 years. Also drinking alcohol daily as above. Seeking treatment given his concern for addiction. He has been using his benzodiazepine for sleep predominantly. Follows with Psychiatry Dr. Mercedes as OP.   - Management per psychiatry team     # Elevated cholesterol:  Cholesterol level 207 this AM, otherwise lipid panel with HDL 85, LDL 98, Triglycerides 122.  - Recommend follow up with PCP within 7 days for repeat fasting lipid panel  - Lifestyle modificiations     # Vit D deficiency:  - Continue PTA cholecalciferol 125mcg daily     # Seasonal allergies:  - Continue PTA Loratadine 10mg daily PRN (reports he does not take this regularly)      # Depression:  On PTA Zoloft 200mg daily and Wellbutrin 150mg daily. Denies SI/HI.   - Management per psychiatry team     # Tobacco use  # Cannabis use:  Smokes 0.5ppd. Smoked off and on since age 20yo. Also uses smokeless tobacco and marijuana occasionally. Denies any other illicit drug use.  - Encouraged cessation  - Nicotine supplementation per psychiatry team     # History of ulcerative proctitis:  Diagnosed on colonoscopy in 2014. Follows with Dr. Rausch, GI at [facility]. Last seen 2019. He reports that his symptoms are now controlled and he takes JJ-E supplementation daily. He denies any abdominal pain, diarrhea, constipation, melena or hematochezia.   - Follow-up with Gastroenterology as outpatient as needed  - Follow up with PCP as outpatient      # Asymptomatic bradycardia:  Reports his HR generally run 50-60s.  HR currently 45-53bpm.  ECG obtained showing sinus bradycardia without evidence of heart block with HR 44bpm. He is asymptomatic and denies dizziness, lightheadedness, chest pain or SOB.   - Monitor HR - please notify medicine if symptomatic including dizziness, lightheadedness, chest pain or SOB or presyncope       Medicine will monitor HR peripherally, please page with any additional concerns.               Pt was seen by cm  As per recommendations from cm  Pt signed OMAYRA and clinical faxed to [facility] for referral.  AVS completed.    He was assessed for the outpatient mental health chemical dependency treatment program but he does not want to do so he wants to do a program which allows him to take the Ativan    Consult Orders   [facility] DA IP Consult (To Assess & Treat) [[id number]] ordered by Phil Wang MD at 03/09/21 1428               []Hide copied text    []Hover for details  3/9/21  The writer spoke with the patient about treatment options. The writer discussed the details of the Dual Disorder Program with the patient. Patient reported he was not aware he would not be able to be on prescribed Ativan during the program and that the program was during the day. Patient reported he would like to continue with his prescribed Ativan as this is the only medication he has found to be helpful with his sleep. Patient reported he would also like to be able to work during the day. Patient reported he would have most interest in an evening IOP treatment program at this time. The writer recommended he fill out the paperwork that was provided by the unit  FA[facility] to complete a AUSTYN assessment. The writer told the patient she would follow up with the unit  to let her know that is what he would like to pursue. The writer answered all the patient's questions regarding treatment programs. Patient verbalized understanding and said he would do the paperwork that was provided to him and that he would also  follow up with the  about options/resources.The writer will the close consult at this time as patient has interest in evening IOP treatment. If patient's desire changes please re-order consult at that time                   Labs:reviewed with patient       Recent Results (from the past 48 hour(s))   Alcohol breath test POCT    Collection Time: 03/08/21  4:26 PM   Result Value Ref Range    Alcohol Breath Test 0.000 0.00 - 0.01   CBC with platelets differential    Collection Time: 03/08/21  6:01 PM   Result Value Ref Range    WBC 6.7 4.0 - 11.0 10e9/L    RBC Count 4.43 4.4 - 5.9 10e12/L    Hemoglobin 14.8 13.3 - 17.7 g/dL    Hematocrit 43.1 40.0 - 53.0 %    MCV 97 78 - 100 fl    MCH 33.4 (H) 26.5 - 33.0 pg    MCHC 34.3 31.5 - 36.5 g/dL    RDW 11.5 10.0 - 15.0 %    Platelet Count 242 150 - 450 10e9/L    Diff Method Automated Method     % Neutrophils 65.4 %    % Lymphocytes 22.0 %    % Monocytes 9.0 %    % Eosinophils 2.8 %    % Basophils 0.4 %    % Immature Granulocytes 0.4 %    Nucleated RBCs 0 0 /100    Absolute Neutrophil 4.4 1.6 - 8.3 10e9/L    Absolute Lymphocytes 1.5 0.8 - 5.3 10e9/L    Absolute Monocytes 0.6 0.0 - 1.3 10e9/L    Absolute Eosinophils 0.2 0.0 - 0.7 10e9/L    Absolute Basophils 0.0 0.0 - 0.2 10e9/L    Abs Immature Granulocytes 0.0 0 - 0.4 10e9/L    Absolute Nucleated RBC 0.0    Comprehensive metabolic panel    Collection Time: 03/08/21  6:01 PM   Result Value Ref Range    Sodium 136 133 - 144 mmol/L    Potassium 3.8 3.4 - 5.3 mmol/L    Chloride 104 94 - 109 mmol/L    Carbon Dioxide 25 20 - 32 mmol/L    Anion Gap 7 3 - 14 mmol/L    Glucose 85 70 - 99 mg/dL    Urea Nitrogen 19 7 - 30 mg/dL    Creatinine 0.86 0.66 - 1.25 mg/dL    GFR Estimate >90 >60 mL/min/[1.73_m2]    GFR Estimate If Black >90 >60 mL/min/[1.73_m2]    Calcium 9.1 8.5 - 10.1 mg/dL    Bilirubin Total 0.2 0.2 - 1.3 mg/dL    Albumin 4.4 3.4 - 5.0 g/dL    Protein Total 7.8 6.8 - 8.8 g/dL    Alkaline Phosphatase 100 40 - 150  U/L    ALT 38 0 - 70 U/L    AST 28 0 - 45 U/L   Asymptomatic SARS-CoV-2 COVID-19 Virus (Coronavirus) by PCR    Collection Time: 03/08/21  6:01 PM    Specimen: Nasopharyngeal   Result Value Ref Range    SARS-CoV-2 Virus Specimen Source Nasopharyngeal     SARS-CoV-2 PCR Result NEGATIVE     SARS-CoV-2 PCR Comment       Testing was performed using the Xpert Xpress SARS-CoV-2 Assay on the Cepheid Gene-Xpert   Instrument Systems. Additional information about this Emergency Use Authorization (EUA)   assay can be found via the Lab Guide.     Drug abuse screen 6 urine (tox)    Collection Time: 03/08/21  6:06 PM   Result Value Ref Range    Amphetamine Qual Urine Negative NEG^Negative    Barbiturates Qual Urine Negative NEG^Negative    Benzodiazepine Qual Urine Negative NEG^Negative    Cannabinoids Qual Urine Negative NEG^Negative    Cocaine Qual Urine Negative NEG^Negative    Ethanol Qual Urine Negative NEG^Negative    Opiates Qualitative Urine Negative NEG^Negative   GGT    Collection Time: 03/09/21  6:33 AM   Result Value Ref Range    GGT 26 0 - 75 U/L   Lipid panel    Collection Time: 03/09/21  6:33 AM   Result Value Ref Range    Cholesterol 207 (H) <200 mg/dL    Triglycerides 122 <150 mg/dL    HDL Cholesterol 85 >39 mg/dL    LDL Cholesterol Calculated 98 <100 mg/dL    Non HDL Cholesterol 122 <130 mg/dL   TSH with free T4 reflex and/or T3 as indicated    Collection Time: 03/09/21  6:33 AM   Result Value Ref Range    TSH 1.49 0.40 - 4.00 mU/L   Vitamin B12    Collection Time: 03/09/21  6:33 AM   Result Value Ref Range    Vitamin B12 388 193 - 986 pg/mL   EKG 12-lead, complete    Collection Time: 03/09/21  9:27 AM   Result Value Ref Range    Interpretation ECG Click View Image link to view waveform and result          Recent Results (from the past 240 hour(s))   Alcohol breath test POCT    Collection Time: 03/08/21  4:26 PM   Result Value Ref Range    Alcohol Breath Test 0.000 0.00 - 0.01   CBC with platelets differential     Collection Time: 03/08/21  6:01 PM   Result Value Ref Range    WBC 6.7 4.0 - 11.0 10e9/L    RBC Count 4.43 4.4 - 5.9 10e12/L    Hemoglobin 14.8 13.3 - 17.7 g/dL    Hematocrit 43.1 40.0 - 53.0 %    MCV 97 78 - 100 fl    MCH 33.4 (H) 26.5 - 33.0 pg    MCHC 34.3 31.5 - 36.5 g/dL    RDW 11.5 10.0 - 15.0 %    Platelet Count 242 150 - 450 10e9/L    Diff Method Automated Method     % Neutrophils 65.4 %    % Lymphocytes 22.0 %    % Monocytes 9.0 %    % Eosinophils 2.8 %    % Basophils 0.4 %    % Immature Granulocytes 0.4 %    Nucleated RBCs 0 0 /100    Absolute Neutrophil 4.4 1.6 - 8.3 10e9/L    Absolute Lymphocytes 1.5 0.8 - 5.3 10e9/L    Absolute Monocytes 0.6 0.0 - 1.3 10e9/L    Absolute Eosinophils 0.2 0.0 - 0.7 10e9/L    Absolute Basophils 0.0 0.0 - 0.2 10e9/L    Abs Immature Granulocytes 0.0 0 - 0.4 10e9/L    Absolute Nucleated RBC 0.0    Comprehensive metabolic panel    Collection Time: 03/08/21  6:01 PM   Result Value Ref Range    Sodium 136 133 - 144 mmol/L    Potassium 3.8 3.4 - 5.3 mmol/L    Chloride 104 94 - 109 mmol/L    Carbon Dioxide 25 20 - 32 mmol/L    Anion Gap 7 3 - 14 mmol/L    Glucose 85 70 - 99 mg/dL    Urea Nitrogen 19 7 - 30 mg/dL    Creatinine 0.86 0.66 - 1.25 mg/dL    GFR Estimate >90 >60 mL/min/[1.73_m2]    GFR Estimate If Black >90 >60 mL/min/[1.73_m2]    Calcium 9.1 8.5 - 10.1 mg/dL    Bilirubin Total 0.2 0.2 - 1.3 mg/dL    Albumin 4.4 3.4 - 5.0 g/dL    Protein Total 7.8 6.8 - 8.8 g/dL    Alkaline Phosphatase 100 40 - 150 U/L    ALT 38 0 - 70 U/L    AST 28 0 - 45 U/L   Asymptomatic SARS-CoV-2 COVID-19 Virus (Coronavirus) by PCR    Collection Time: 03/08/21  6:01 PM    Specimen: Nasopharyngeal   Result Value Ref Range    SARS-CoV-2 Virus Specimen Source Nasopharyngeal     SARS-CoV-2 PCR Result NEGATIVE     SARS-CoV-2 PCR Comment       Testing was performed using the [employer] SARS-CoV-2 Assay on the Cepheid Gene-Xpert   Instrument Systems. Additional information about this Emergency Use  Authorization (EUA)   assay can be found via the Lab Guide.     Drug abuse screen 6 urine (tox)    Collection Time: 03/08/21  6:06 PM   Result Value Ref Range    Amphetamine Qual Urine Negative NEG^Negative    Barbiturates Qual Urine Negative NEG^Negative    Benzodiazepine Qual Urine Negative NEG^Negative    Cannabinoids Qual Urine Negative NEG^Negative    Cocaine Qual Urine Negative NEG^Negative    Ethanol Qual Urine Negative NEG^Negative    Opiates Qualitative Urine Negative NEG^Negative   GGT    Collection Time: 03/09/21  6:33 AM   Result Value Ref Range    GGT 26 0 - 75 U/L   Lipid panel    Collection Time: 03/09/21  6:33 AM   Result Value Ref Range    Cholesterol 207 (H) <200 mg/dL    Triglycerides 122 <150 mg/dL    HDL Cholesterol 85 >39 mg/dL    LDL Cholesterol Calculated 98 <100 mg/dL    Non HDL Cholesterol 122 <130 mg/dL   TSH with free T4 reflex and/or T3 as indicated    Collection Time: 03/09/21  6:33 AM   Result Value Ref Range    TSH 1.49 0.40 - 4.00 mU/L   Vitamin B12    Collection Time: 03/09/21  6:33 AM   Result Value Ref Range    Vitamin B12 388 193 - 986 pg/mL   EKG 12-lead, complete    Collection Time: 03/09/21  9:27 AM   Result Value Ref Range    Interpretation ECG Click View Image link to view waveform and result             Because this patient meets criteria for an Alcohol Use Disorder, I performed the following brief intervention on the date of this note:              1) Expressed concern that the patient is drinking at unhealthy levels known to increase their risk of alcohol related problems              2) Gave feedback linking alcohol use and health, including personalized feedback explaining how alcohol use can interact with their medical and/or psychiatric problems, and with prescribed medications.              3) Advised patient to abstain.    PT counseled on nicotine cessation and nicotine replacement provided    Discussed with patient many issues of addiction,triggers, relapse, and  establishing a solid recovery program.    DISCHARGE MENTAL STATUS EXAMINATION:  The patient is alert, oriented x3.  Good fund of knowledge.  Good use of language.  Recent and remote memory, language, fund of knowledge are all adequate.  Euthymic mood congruent affect  Speech normal rate/rhythm linear tp no loose asso,The patient does not have any active suicidal or homicidal ideation.  Does not have any auditory or visual hallucination.  Fair insight/judgment At this time, the patient was stable to be discharged.        Pt was not determined to not be a danger to himself or others. At the current time of discharge, the patient does not meet criteria for involuntary hospitalization. On the day of discharge, the patient reports that they do not have suicidal or homicidal ideation and would never hurt themselves or others. Steps taken to minimize risk include: assessing patient s behavior and thought process daily during hospital stay, discharging patient with adequate plan for follow up for mental and physical health and discussing safety plan of returning to the hospital should the patient ever have thoughts of harming themselves or others. Therefore, based on all available evidence including the factors cited above, the patient does not appear to be at imminent risk for self-harm, and is appropriate for outpatient level of care.     Educated about side effects/risk vs benefits /alternative including non treatment.Pt consented to be on medication.     .Total time spent on discharge summary more than 35 min  More than  20 min  planning, coordination of care, medication reconciliation and performance of physical exam on day of discharge.Care was coordinated with unit RN and unit therapist       Gus Brown   [facility] Medication Instructions SCOT:[id number]    Printed on:03/10/21 1000   Medication Information                      buPROPion (WELLBUTRIN XL) 150 MG 24 hr tablet  Take 150 mg by mouth daily              cholecalciferol (VITAMIN D3) 125 mcg (5000 units) capsule  Take 125 mcg by mouth daily             loratadine (CLARITIN) 10 MG tablet  Take 10 mg by mouth daily             LORazepam (ATIVAN) 1 MG tablet  Take 1 mg by mouth 2 times daily as needed for anxiety or sleep             Omega-3 Fatty Acids (FISH OIL OMEGA-3 PO)  Take 1 capsule by mouth daily             Probiotic Product (PROBIOTIC DAILY PO)  Take 1 capsule by mouth daily             S-Adenosylmethionine (JJ-E PO)  Take 1 tablet by mouth daily             sertraline (ZOLOFT) 100 MG tablet  Take 200 mg by mouth daily             THIAMINE HCL PO  Take 1 tablet by mouth daily             VITAMIN D PO  Take 1 tablet by mouth daily                  Discharge medications   Gus Brown   Home Medication Instructions SCOT:[id number]    Printed on:03/10/21 1243   Medication Information                      cholecalciferol (VITAMIN D3) 125 mcg (5000 units) capsule  Take 125 mcg by mouth daily             loratadine (CLARITIN) 10 MG tablet  Take 10 mg by mouth daily             multivitamin w/minerals (THERA-VIT-M) tablet  Take 1 tablet by mouth daily             nicotine (NICODERM CQ) 14 MG/24HR 24 hr patch  Place 1 patch onto the skin daily             Omega-3 Fatty Acids (FISH OIL OMEGA-3 PO)  Take 1 capsule by mouth daily             PHENobarbital (LUMINAL) 32.4 MG tablet  Take 1 tablet (32.4 mg) by mouth At Bedtime             Probiotic Product (PROBIOTIC DAILY PO)  Take 1 capsule by mouth daily             S-Adenosylmethionine (JJ-E PO)  Take 1 tablet by mouth daily             sertraline (ZOLOFT) 100 MG tablet  Take 200 mg by mouth daily             THIAMINE HCL PO  Take 1 tablet by mouth daily             VITAMIN D PO  Take 1 tablet by mouth daily               Disposition: Home     Facts about COVID19 at www.cdc.gov/COVID19 and www.[place].gov/covid19     Keeping hands clean is one of the most important steps we can take to avoid getting sick and  "spreading germs to others.  Please wash your hands frequently and lather with soap for at least 20 seconds!     Medical Follow-Up:     Primary Provider:  [facility]  Psychiatrist in [facility] info: [employer]  Get online care: [email or web address]  Address: [address] [place] [address] [address], [place] [address]  Hours:   Open ? Closes 5PM     Phone: [phone]  Appointment : Wednesday, March 24, 2021 @ 3 pm.         [facility] in [place], [place]  COVID-19 info: [email or web address]  Get online care: [email or web address]  Address: [address], [place] [address]  Hours:   Open ? Closes 5PM  Phone: [phone]  Treatment Follow-Up:Pt signed OMAYRA and clinical faxed to [facility] for referral.  AVS completed.  .        \"Much or all of the text in this note was generated through the use of Dragon Dictate voice to text software. Errors in spelling or words which appear to be out of contact are unintentional, may be present due having escaped editing\"     "

## 2021-03-10 NOTE — PROGRESS NOTES
No medications for alcohol withdrawal x 24 hours.   Pulse continues to run low.   Will complete alcohol withdrawal monitoring.

## 2021-03-10 NOTE — PROGRESS NOTES
"[facility] [address]  UNIVERSAL ADULT DIAGNOSTIC ASSESSMENT - Substance Use Disorder    Provider Name and Credentials: Pat Gonzalez, [facility]     PATIENT'S NAME: Gus Brown  PREFERRED NAME: Harshal  PRONOUNS: he/him/his     MRN: [id number]  : 1965   Last 4 SSN: [id number]  ACCT. NUMBER:  [id number]  DATE OF SERVICE: 3/10/2021   START TIME: 9:15 AM  END TIME: 9:45 AM  PREFERRED PHONE: [phone]   May we leave a program related message: Yes  SERVICE MODALITY:  Phone Visit:      Provider verified identity through the following two step process.  Patient provided:  Patient  and Patient's last 4 digits of SSN     The patient has been notified of the following:      \"We have found that certain health care needs can be provided without the need for a face to face visit.  This service lets us provide the care you need with a phone conversation.       I will have full access to your [facility] medical record during this entire phone call.   I will be taking notes for your medical record.      Since this is like an office visit, we will bill your insurance company for this service.       There are potential benefits and risks of telephone visits (e.g. limits to patient confidentiality) that differ from in-person visits.?Confidentiality still applies for telephone services, and nobody will record the visit.  It is important to be in a quiet, private space that is free of distractions (including cell phone or other devices) during the visit.??      If during the course of the call I believe a telephone visit is not appropriate, you will not be charged for this service\"     Consent has been obtained for this service by care team member: Yes     Identifying Information:  Patient is a 56 year old, White male who was referred for an assessment by self. The pronoun use throughout this assessment reflects the patient's chosen pronoun. Patient attended the session alone.     Chief Complaint:   The " "reason for seeking services at this time is: \"drinking.\"  The problem(s) began at age 51. Patient has not attempted to resolve these concerns in the past.  Patient does not appear to be in severe withdrawal, an imminent safety risk to self or others, or requiring immediate medical attention and may proceed with the assessment interview.    Social/Family History:  Patient reported he grew up in [place]. Patient was raised by both parents. Patient reported that his childhood was \"stable.\"  Patient describes current relationships with family of origin as positive.      The patient describes his cultural background as Maori. Pt is an [place] resident who grew up in [place]. Pt reports he was \"brought up secular,\" and practices Taoism.  Cultural influences and impact on patient's life structure, values, norms, and healthcare: Immigration History and Status: [place] from [facility], and Spiritual Beliefs: Taoism.  Contextual influences on patient's health include: Individual Factors MH/CD issues.  Patient identified his preferred language to be English, and pt also speaks Maori. Patient reported he does not need the assistance of an  or other support involved in therapy.     Patient reports he is involved in community of luis activities - [employer] organization, currently only meeting on Zoom due to COVID. Pt also has one friend he meets with to practice ceremonies together. Patients reports spirituality impacts his recovery in the following ways:  \"I think it's very important.\"     Patient reported had no significant delays in developmental tasks.  Patient's highest education level was associate degree / vocational certificate. Patient identified the following learning problems: attention and concentration.  Patient reports he is able to understand written materials.    Patient reported the following relationship history.  Patient's current relationship status is single for the past two years.   " "Patient identified his sexual orientation as straight.  Patient reported having two children, one is age 20, and one is turning 18 in a few weeks.     Patient's current living/housing situation involves staying in own home/apartment.  Patient lives alone and he reports that housing is stable. Patient identified friends and daughter, family (in [place]) as part of his support system.  Patient identified the quality of these relationships as stable and meaningful.      Patient reports engaging in the following recreational/leisure activities: music- guitar/singing. 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  Patient reports his income is obtained through this role, and the stimulus/unemployment.  Patient does not identify finances as a current stressor.      Patient denies substance related arrests or legal issues.  Patient denies being on probation / parole / under the jurisdiction of the court.    Patient's Strengths and Limitations:  Patient identified the following strengths or resources that will help him succeed in treatment: \"sincerity, spiritual belief, caring about family\". Things that may interfere with the patient's success in treatment include: financial hardship and pt is currently looking for work.     Personal and Family Medical History:   Patient did report a family history of mental health concerns.  Patient reports the following family history:   Family History   Problem Relation Age of Onset     No Known Problems Mother      Alcoholism Father      Depression Father      No Known Problems Sister      No Known Problems Brother      Depression Paternal Grandfather         Patient reported the following previous mental health diagnoses: depression, ADHD.  Patient reports their primary mental health symptoms include: lately, anxiety and these do impact his ability to function. Pt has not experienced a deep depressive " "episode \"in a fairly long time.\"  Patient has received mental health services in the past: therapy, psychiatry.  Psychiatric Hospitalizations: None.  Patient denies a history of civil commitment.  Current mental health services/providers include:  Psychiatry from [facility] - Dr. Bertin Jung.     GAIN-SS Tool:    When was the last time that you had significant problems...  a. with feeling very trapped, lonely, sad, blue, depressed or hopeless about the future? Past Month  b. with sleep trouble, such as bad dreams, sleeping restlessly, or falling asleep during the day? 1+ years ago  c. with feeling very anxious, nervous, tense, scared, panicked or like something bad was going to happen?  2 - 12 months ago  d. with becoming very distressed and upset when something reminded you of the past?  2 - 12 months ago  e. with thinking about ending your life or committing suicide?  Never  When was the last time that you did the following things two or more times?  a. Lied or conned to get things you wanted or to avoid having to do something?   1+ years ago  b. Had a hard time paying attention at school, work or home? Past Month  c. Had a hard time listening to instructions at school, work or home?  2 - 12 months ago  d. Were a bully or threatened other people?  Never  e. Started physical fights with other people?  Never    Patient has had a physical exam to rule out medical causes for current symptoms.  Date of last physical exam was within the past year. Client was encouraged to follow up with PCP if symptoms were to develop. The patient has a PCP Mark Depaulis, [facility] [place]. Patient reports no current medical concerns.  Patient denies any issues with pain..   Patient denies pregnancy. There are not significant appetite / nutritional concerns / weight changes. Patient does not report a history of an eating disorder. Patient does not report a history of head injury / trauma / cognitive impairment. " "    Patient reports current meds as:   Outpatient Medications Marked as Taking for the 3/8/21 encounter (Hospital Encounter)   Medication Sig     buPROPion (WELLBUTRIN XL) 150 MG 24 hr tablet Take 150 mg by mouth daily     cholecalciferol (VITAMIN D3) 125 mcg (5000 units) capsule Take 125 mcg by mouth daily     loratadine (CLARITIN) 10 MG tablet Take 10 mg by mouth daily     LORazepam (ATIVAN) 1 MG tablet Take 1 mg by mouth 2 times daily as needed for anxiety or sleep     Omega-3 Fatty Acids (FISH OIL OMEGA-3 PO) Take 1 capsule by mouth daily     Probiotic Product (PROBIOTIC DAILY PO) Take 1 capsule by mouth daily     S-Adenosylmethionine (JJ-E PO) Take 1 tablet by mouth daily     sertraline (ZOLOFT) 100 MG tablet Take 200 mg by mouth daily     THIAMINE HCL PO Take 1 tablet by mouth daily     VITAMIN D PO Take 1 tablet by mouth daily       Medication Adherence:  Patient reports taking prescribed medications as prescribed.    Patient Allergies:    Allergies   Allergen Reactions     Gluten Meal GI Disturbance     Dx of IBS; pt states \"gets bloated\"     Milk Protein Extract      Wheat Extract        Medical History:  No past medical history on file.    Rating Scales:    PHQ9:  No flowsheet data found.;      Substance Use:  Patient reported the following biological family members or relatives with chemical health issues: pt's father, and multiple family members on father's side struggled with alcohol.  Patient has not received substance use disorder and/or gambling treatment in the past.  Patient has not been to detox before current admission.  Patient is not currently receiving any chemical dependency treatment. Patient reports no history of support group attendance.      Substance Age of first use Pattern and duration of use (include amounts and frequency) Date of last use     Withdrawal potential Route of administration   Has used Alcohol 16 Current/heaviest: Up to 10 bottles of beer/day, gradual increase over past " "2 years 3/8/21 @ 0000 Yes oral   Has used Marijuana   27 Current: Rare use, when offered, no pattern  3/7/21 @ 2200 No smoked     Has used Benzodiazepine 54 Current/heaviest: Lorazepam - prescribed 40 1 mg tabs per month, takes 1 about half hour before bed time, occasionally takes an extra half one if very wound up. Pt has not used beyond his prescription or needed to increase his prescription.  3/7/21 @ 2300 Yes oral   Has used Nicotine  27 Current: 8-10 cigarettes/day plus smokeless tobacco (maybe 3 pouches/day), restarted 2 years ago after 6 years of no use 3/8/21 @ 1400 Yes smoked, plus the smokeless      Patient reported the following problems as a result of their substance use: household management, completion of tasks, organization.  Patient is concerned about substance use.     Patient reports experiencing the following withdrawal symptoms within the past 12 months: shaky/jittery/tremors, agitation, sad/depressed feeling, irritability and anxiety/worry and the following within the past 30 days: shaky/jittery/tremors, agitation, sad/depressed feeling, irritability and anxiety/worry.   Patients reports urges to use Alcohol.  Patient reports he has not used more Alcohol than intended or over a longer period of time than intended. Patient reports he has not had unsuccessful attempts to cut down or control use of Alcohol.  Patient reports no significant period of abstinence. Pt reports he \"used to not drink for 1-3 days/week\" but has been using daily for last 2 years. Patient reports he has needed to use more Alcohol to achieve the same effect.  Patient does  report diminished effect with use of same amount of Alcohol.     Patient does  report a great deal of time is spent in activities necessary to obtain, use, or recover from Alcohol effects.  Patient does  report important social, occupational, or recreational activities are given up or reduced because of Alcohol use (many of these activities went away due to " "COVID, and pt reports it takes effort to add them back into one's life and he has not done this yet).  Alcohol use is continued despite knowledge of having a persistent or recurrent physical or psychological problem that is likely to have caused or exacerbated by use.  Patient reports the following problem behaviors while under the influence of substances: none. Patient reports his recovery goals are \"I am still defining them. Would like to be able to drink in moderation. Until my life situation changes I think it is better to stay sober.\"      Patient reports substance use has not impacted his ability to function in a school setting. Patient reports substance use has impacted his ability to function in a work setting.  Patients demographics and history impact his recovery in the following ways:  Lack of employment/structure day-to-day, genetic history of substance use issues. Patient reports engaging in the following recreation/leisure activities while using:  \"I have been drinking every day, so I should think many things are associated. Need to learn to live sober.\"  Patient reports the following people are supportive of recovery: friends, his daughter, his family who are in [facility].     Patient does not have a history of gambling concerns and/or treatment. Patient does not have other addictive behaviors he is concerned about.       Dimension Scale Ratings:    Dimension 1 -  Acute Intoxication/Withdrawal: 0 - No Problem  Dimension 2 - Biomedical: 1 - Minor Problem  Dimension 3 - Emotional/Behavioral/Cognitive Conditions: 1 - Minor Problem  Dimension 4 - Readiness to Change:  1 - Minor Problem  Dimension 5 - Relapse/Continued Use/ Continued Problem Potential: 3 - Severe Problem  Dimension 6 - Recovery Environment:  2 - Moderate Problem    Significant Losses / Trauma / Abuse / Neglect Issues:   Patient did not serve in the .  There are indications or report of significant loss, trauma, abuse or neglect issues " related to:   Death of his father in November 2019   Job loss - pt lost his job due to COVID.  Concerns for possible neglect are not present.     Safety Assessment:   Current Safety Concerns:  [place] Suicide Severity Rating Scale (Short Version)  [place] Suicide Severity Rating (Short Version) 3/8/2021   Over the past 2 weeks have you felt down, depressed, or hopeless? yes   Over the past 2 weeks have you had thoughts of killing yourself? no   Have you ever attempted to kill yourself? no   Required Interventions Provider notified;Room searched;Room made safe;Patient searched;Belongings removed   Interventions Monitored via video;DEC consulted     Patient denies current homicidal ideation and behaviors.  Patient denies current self-injurious ideation and behaviors.    Patient denied risk behaviors associated with substance use.  Patient denies any high risk behaviors associated with mental health symptoms.  Patient reports the following current concerns for their personal safety: None.  Patient reports there are not  firearms in the house.     History of Safety Concerns:  Patient denied a history of homicidal ideation.     Patient denied a history of personal safety concerns.    Patient denied a history of assaultive behaviors.    Patient denied a history of sexual assault behaviors.     Patient denied a history of risk behaviors associated with substance use.  Patient denies any history of high risk behaviors associated with mental health symptoms.  Patient reports the following protective factors: spirituality, positive relationships positive social network and positive family connections, dedication to family/friends, safe and stable environment, effectively controls impulses, regular physical activity, help seeking behaviors when distressed, daily obligations, effective problem-solving skills, positive social skills, financial stability, strong sense of self-worth/esteem, sense of personal control or  determination and pets    Risk Plan:  See Recommendations for Safety and Risk Management Plan    Review of Symptoms per patient report:  Substance Use:  hangovers, daily use and cravings/urges to use     Diagnostic Criteria:  OP BEH AUSTYN CRITERIA: There is persistent desire or unsuccessful efforts to cut down or control use of the substance.  Met for:  Alcohol,  A great deal of time is spent in activities necessary to obtain the substance, use the substance, or recover from its effects.  Met for:  Alcohol, Craving, or a strong desire or urge to use the substance.  Met for:  Alcohol, Recurrent use of the substance resulting in a failure to fulfill major role obligations at work, school, or home.  Met for:  Alcohol, Important social, occupational, or recreational activities are given up or reduced because of the substance.  Met for:  Alcohol, Use of the substance is continued despite knowledge of having a persistent or recurrent physical or psychological problem that is likely to have been cause or exacerbated by the substance.  Met for:  Alcohol, Tolerance:  either a need for markedly increased amounts of the substance to achieve the desired effect or a markedly diminished effect with continued use of the dame amount of the substance.  Met for:  Alcohol, Withdrawal:  either patient endorses characteristic withdrawal syndrome for the substance or the substance (or closely related substance) is taken to relieve or avoid withdrawal symptoms.  Met for:  Alcohol       As evidenced by self report and criteria, client meets the following DSM5 Diagnoses:   (Sustained by DSM5 Criteria Listed Above)  Alcohol Use Disorder   303.90 (F10.20) Severe In a controlled environment.    Recommendations:     1. Plan for Safety and Risk Management:   Recommended that patient call 911 or go to the local ED should there be a change in any of these risk factors..            Report to child / adult protection services was NA.     2. Patient's  identified mental health and substance use concerns with a cultural influence will be addressed by standard programming.     3. Recommendations for treatment focus:    Alcohol / Substance Use - Referral to iop.      4.  Mental Health Referrals:   The following referral(s) will be initiated: Intensive Outpatient Chemical Health Treatment . Next Scheduled Appointment: Not yet scheduled.    5. AUSTYN Referrals:    Recommendations:  Referral to evening IOP.  Patient reports they are willing to follow these recommendations. Patient does not have a history of opiate use.    6. Records:   These were reviewed at time of assessment.   Information in this assessment was obtained from the medical record and provided by patient who is a good historian.   Patient will have open access to their mental health medical record.    [facility] Assessment ID: [id number]  Provider Name/ Credentials:  Pat Gonzalez, MAGY, SMITHAC  March 10, 2021

## 2021-03-10 NOTE — PLAN OF CARE
Problem: Adult Inpatient Plan of Care  Goal: Plan of Care Review  Outcome: Adequate for Discharge  Flowsheets (Taken 3/10/2021 1045)  Plan of Care Reviewed With: patient     Problem: Acute Neurologic Deterioration (Alcohol Withdrawal)  Goal: Optimal Neurologic Function  Outcome: Adequate for Discharge   Pt's alcohol withdrawal is complete.   Pt had received a total of 20 mg of valium since admission.     Pt had not received phenobarbital related to low pulse.   Pt has been bradycardic. EKG done.   Pt provided handout on bradycardia and instructions on how to take own pulse and normal range.     Pt came in with a supply of ativan.   Discussed with Dr. Wang who stated that patient should be requested to not take ativan home and complete to phenobarbital taper ordered for discharge.     Pt gave permission to leave home supply of ativan and he would take phenobarbital as ordered for discharge.     Pt home supply of ativan disposed of via [employer] with second nurse Lucy davila as witness.     Pt provided handouts on coping skills for anxiety and a progressive muscle relaxation CD.     Discussed risks of mixing alcohol and benzodiazepines.     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     When he took trazodone he states it made his groggy. Pt provided sleep hygiene tip sheet.     Pt plans to follow up with a outpatient CD treatment referral.   Pt has car here and will be driving self home.     Reports his mood as stable. Denies feeling anxious at this time.   Denies depression. Denies SI.     Appetite has been fair.     Reviewed discharge instructions with patient and he verbalized understanding.     See discharge instructions.

## 2021-03-10 NOTE — PROGRESS NOTES
Pt.scored 3 on MSSA. He did not meet the parameter for prn valium. He slept throughout the night. Will continue to monitor.

## 2021-03-10 NOTE — PLAN OF CARE
Patient intermittently visible in the milieu, social with select peers. Patient affect neutral, mood is calm. Patient denies SI, SIB, HI, or hallucinations. Patient continues to be monitored for alcohol withdrawal. MSSA scores 3 and 2. Patient continues to have bradycardia. Pulse 40's-50's. Patient reporting some nausea, was given Zofran times one. Patient given prn tylenol 650 mg and prn hydroxyzine 25 mg times one for anxiety and headache. Patient completed CD assessment paperwork. Patient interested in [facility] outpatient program. Patient wanting to discharge tomorrow. He states he feels ready. Patient's phenobarbital was held due to low pulse. Patient denies any additional concerns.

## 2021-03-10 NOTE — DISCHARGE INSTRUCTIONS
Behavioral Discharge Planning and Instructions  THANK YOU FOR CHOOSING THE [facility]  3A  [phone]    Summary: You were admitted to Station 3A on 3/9/21 for detoxification from alcohol.  A medical exam was performed that included lab work. You have met with a  and opted to pursue Outpatient treatment with [facility].  Please take care and make your recovery a priority, Per!    [facility]  [facility]   [address], [address]  [place]  [address]  Phone: [phone]  Fax: [phone]    [facility]  [address], [address]  [place]  [address]  Phone: [phone]  Fax: [phone]      Addiction Recovery - Outpatient Treatment  Group of diverse women discussing in the meetingOur outpatient recovery centers provide quality treatment, lifetime recovery and personal care to help individuals get the support they need while remaining in the community. Clients are able to maintain family, work and personal commitments while attending treatment and pursuing their personal recovery goals.    Many of our outpatient clients are stepping down from a more intensive level of care, while others are starting in an outpatient program. We serve clients who are new to the treatment experience, as well as those who have been to treatment before.    Your assessment has been faxed.  Please call the referral line at [phone] and ask to schedule your intake if you do not hear from them by Monday, 3/15/21.    Main Diagnosis: Per Dr. Phil Wang MD; psychiatrist  Alcohol use disorder severe alcohol withdrawal severe  Alcohol dependence         Major Treatments, Procedures and Findings:  You were detoxed from alcohol with the Modified Selective Severity Protocol using Valium. You have met with a  to develop a treatment plan for discharge.  You have had labs drawn and a copy of those  labs will be sent home with you.  Please bring your lab results with to your follow up doctor appointment.    Symptoms to Report:  If you experience more anxiety, confusion, sleeplessness, deep sadness or thoughts of suicide, notify your treatment team or notify your primary care physician. IF ANY OF THE SYMPTOMS YOU ARE EXPERIENCING ARE A MEDICAL EMERGENCY CALL 911 IMMEDIATELY.     Lifestyle Adjustment: Health Action Plan:  1.Create a daily schedule  2. Eat Healthy  3. Plan Enjoyable Sober Activities  4. Use Problem Solving Skills and Deal with Issues as they Arise.   5. Be Physically Active  6. Take your medications as prescribed  7. Get enough restful sleep  8. Practice Relaxation  9. Spend time with Supportive People  10. No use of alcohol, illegal drugs or addictive medications other than what is currently prescribed.   11.AA, NA Sponsor are excellent resources for support  12. Explore how  you can utilize spirituality in your recovery      Primary Provider:  [facility]  Psychiatrist in [place], [place]  COVID-19 info: [employer]  Get online care: [employer]  Address: [address] [place] [address]  Hours:   Open ? Closes 5PM    Phone: [phone]  Appointment : Wednesday, March 24, 2021 @ 3 pm.       [facility] in [place], [place]  COVID-19 info: [facility] care: [facility]  Address: [address], [place] [address]  Hours:   Open ? Closes 5PM  Phone: [phone]    You are aware you should make a follow up appointment with your primary care doctor for medical and medication management    Disposition: home/Outpatient referral      Facts about COVID19 at www.cdc.gov/COVID19 and www.[place].gov/covid19    Keeping hands clean is one of the most important steps we can take to avoid getting sick and spreading germs to others.  Please wash your  hands frequently and lather with soap for at least 20 seconds!      Recovery apps for your phone to locate current in person and zoom recovery meetings  [place] - meeting vu  AA  - meeting vu  Meeting guide - meeting vu  Quick NA meeting - meeting vu  [employer]- has various apps    My Mental Health Crisis Plan allows individuals with serious mental illness to:  Clearly state treatment preferences, including treatments to use and those not to use; medications to use and those not to use; preferences for hospitals; and preferences for doctors and other mental health professionals.  Decide who can act on their behalf, by designating a trusted person (sometimes referred to as  healthcare agent,   proxy,  or  health care power of  ) as a decision-maker on their behalf. Some states require appointment of a decision-maker to carry out the PAD instructions.  Identify whom to notify in the event of a mental health crisis.  Share the plan with others, including doctors, other members of the care team, and family and friends.       Www.Pharmapodrecovery.org  SMART Recovery's 4-Point Program  helps people recover from all types of addictive behaviors, including: alcoholism, drug abuse, substance abuse, drug addiction, alcohol abuse, gambling addiction, cocaine addiction, and addiction to other substances and activities.  [facility] (Self-Management And Recovery Training) is not a 12-step group, like Alcoholics Anonymous (AA) or Narcotics Anonymous (NA).  Teaches self-empowerment and self-reliance.  * Encourages individuals to recover and live satisfying lives.  * Teaches tools and techniques for self-directed change.  * Meetings are educational and include open discussions.  * Advocates the appropriate use of prescribed medications and psychological treatments.  * Evolves as scientific knowledge of addiction recovery evolves.  Many of the modules are available online.             Great Pod casts for  "nutrition and wellness  Listen on Apple Podcasts  Dishing Up Nutrition   [employer]   Nutrition       Understand the connection between what you eat and how you feel. Hosted by licensed nutritionists and dietitians from [employer] we share practical, real-life solutions for healthier living through nutrition.       Resources:     Resources for on line recovery meetings:      *due to covid-19 AA/NA meetings are being held online*      AA meetings can be found online; search for them at: http://aaClient Outlookintergroup.org/directory.php  AA meetings via [place] for [place] area can be found online at: https://aaNurix.org/find-a-meeting/holiday-closings/  NA meetings via [place] for [place] area can be found online at: https://sites.Kaymu.com/view/[place]regionofnarcoticsanonymous/home?authuser=2    Www.[employer]Cont  has online resources for meeting and recovery care including Podcast \"Let's Talk:Addiction & Recovery Podcasts    Www.[place]recovery.org     DISCHARGE RESOURCES:  -[facility]ART [facility] - self management for addiction recovery:  www.smartrecovery.org    -Pathways ~ A Health Crisis Resource & Support Center: 547.724.9918.  -[facility] [phone]   -[facility] Behavioral [facility] 198-879-7912 or 686-770-3399.  -Suicide Awareness Voices of Education (SAVE) (www.save.org): 639-044-NBIM (2278)  -National Suicide Prevention Line (www.mentalhealthmn.org): 562-241-DMQL (3378)  -National [place] on Mental Illness (www.mn.hemanth.org): 180.295.5806 or 827-868-5481.  -Rmbz0wycr: text the word LIFE to 84803 for immediate support and crisis intervention  -Mental Health Consumer/Survivor Network of [place] (www.mhcsn.net): 763.312.8713 or 868-965-7719  -Mental Health Association of [place] (www.mentalhealth.org): 716.692.9526 or 817-245-1942     -Substance Abuse and Mental Health Services (www.samhsa.gov)  -Harm Reduction Coalition (www. Harmreduction.org)  -www.prescribetoprevent.org " or http://prescribetoprevent.org/video  -Poison control 4-672-395-2835   **[place] Opioid Prevention Coalition: www.opioidcoalition.org    Sober Support Group Information:  AA/NA & Sponsor/Support  -Alcoholics Anonymous (www.alcoholics-anonymous.org): for local information 24 hours/day  -AA Intergroup service office in [place] (http://www.aastpaul.org/) 270.320.3391  -AA Intergroup service office in [facility]: 563.177.2161. (http://www.aamin[place].org/)  -Narcotics Anonymous (www.nam[place].org) (518) 337-2919   **Sober Fun Activities: www.soberPatients Know Bestactivities.Travellution/[facility]//[facility] Recovery Connection ([facility])  [facility] connects people seeking recovery to resources that help foster and sustain long-term recovery.  Whether you are seeking resources for treatment, transportation, housing, job training, education, health care or other pathways to recovery, [facility] is a great place to start.    Phone: 335.369.8977.  www.[place] (Great listing of all types of recovery and non-recovery related resources)      General Medication Instructions:   See your medication sheet(s) for instructions.   Take all medicines as directed.  Make no changes unless your doctor suggests them.   Go to all your doctor visits.  Be sure to have all your required lab tests. This way, your medicines can be refilled on time.  Do not use any drugs not prescribed by your provider.  AA/NA and Sponsors are excellent resources for support  Avoid alcohol.    Any follow up concerns:  Nursing questions call the [facility] [phone]  Medical Record call [phone]  Outpatient Behavioral Intake call [phone]  LP+ Wait List/Bed Availability call [phone]    The entire treatment team has appreciated the opportunity to work with you Per.  We wish you the best in the future and with your lifelong recovery goals. Please bring this discharge folder with you to all follow up appointments.  It contains your  lab results, diagnosis, medication list and discharge recommendations.    THANK YOU FOR CHOOSING THE [facility]

## 2021-04-17 ENCOUNTER — HEALTH MAINTENANCE LETTER (OUTPATIENT)
Age: 56
End: 2021-04-17

## 2021-09-26 ENCOUNTER — HEALTH MAINTENANCE LETTER (OUTPATIENT)
Age: 56
End: 2021-09-26

## 2022-05-08 ENCOUNTER — HEALTH MAINTENANCE LETTER (OUTPATIENT)
Age: 57
End: 2022-05-08

## 2022-08-17 ENCOUNTER — TELEPHONE (OUTPATIENT)
Dept: PSYCHIATRY | Facility: CLINIC | Age: 57
End: 2022-08-17

## 2022-11-22 NOTE — TELEPHONE ENCOUNTER
"Spoke to patient and scheduled for TRD intake appointments. Patient notes he has \"electromagnetic sensitivities\" and thus has concerns about ECT or TMS.  "

## 2022-12-15 ENCOUNTER — VIRTUAL VISIT (OUTPATIENT)
Dept: PSYCHIATRY | Facility: CLINIC | Age: 57
End: 2022-12-15
Payer: COMMERCIAL

## 2022-12-15 VITALS — HEIGHT: 69 IN | BODY MASS INDEX: 25.18 KG/M2 | WEIGHT: 170 LBS

## 2022-12-15 DIAGNOSIS — F33.2 SEVERE RECURRENT MAJOR DEPRESSION WITHOUT PSYCHOTIC FEATURES (H): Primary | ICD-10-CM

## 2022-12-15 RX ORDER — LORAZEPAM 1 MG/1
TABLET ORAL
COMMUNITY
Start: 2022-11-25 | End: 2023-01-11

## 2022-12-15 RX ORDER — BREXPIPRAZOLE 1 MG/1
1 TABLET ORAL DAILY
COMMUNITY
Start: 2022-11-25

## 2022-12-15 RX ORDER — GABAPENTIN 300 MG/1
CAPSULE ORAL
COMMUNITY
Start: 2022-12-11

## 2022-12-15 ASSESSMENT — PAIN SCALES - GENERAL: PAINLEVEL: NO PAIN (0)

## 2022-12-15 ASSESSMENT — PATIENT HEALTH QUESTIONNAIRE - PHQ9: SUM OF ALL RESPONSES TO PHQ QUESTIONS 1-9: 18

## 2022-12-15 NOTE — PROGRESS NOTES
"[facility] Treatment Resistant Depression Program  Diagnostic Assessment  A part of the [facility] Psychiatry Mood Disorders Program    Gus Brown MRN# [id number]   Age: 57 year old YOB: 1965     Date of Evaluation: 12/15/22  Start Time: 9:18; End Time: 10:34    Mode of communication: Doxy after not being able to connect via Amwell. Patient consented verbally to this mode of therapy today.  Reason for telehealth: COVID-19. This patient visit was converted to a telehealth visit to minimize exposure to COVID-19.    Originating Location (patient location): home, located in [place], [place]  Distant Location (provider location): Home office, located in [place], [place], using appropriate privacy considerations and procedures         Care Team     PCP- Giorgio Perry  Specialty Providers- no  Therapist- Alyx Gomez  Psychiatric Med Management Provider- Bertin Rivera at [facility]  Other Mental Health Providers- just completed PHP    Referred by:  Self  Referred for evaluation of: depression         Contributors to the Assessment     Chart Reviewed.   Interview completed with Gus Brown.  Releases of information signed?  no  Collateral information obtained? no           Chief Complaint     Increase in the severity of depressive symptoms          History of Present Illness      Gus Brown is a 57 year old male who goes by Harshal and uses he, him pronouns.    Harshal reports two severe lifetime episode(s) of depression and other times of feeling depressed, but less severe. He reports no psychiatric hospitalization(s). Harshal is interested in ketamine treatment. He reports that he has an eletrco-magnetic sensitivity that will preclude using some treatments in the TRD program.    Harshal's most recent episode of depression started worsening about one year ago.     Harshal's first experience with depression was when he was a child. He reports \"feeling sad\" and \"like I " "don't belong\" in childood.    Current psychosocial stressors discussed include financial insecurity, isolation     Discussed other mental health concerns apart from depression, including anxiety, trauma    Psych critical item history includes substance use: alcohol, mutiple psychotropic trials  and major medical problems         Psychiatric Review of Systems (Completed M.I.N.I. Version 7.0.2     A. DEPRESSION  Past 2 Weeks:  low mood nearly every day, anhedonia most of the time, difficulties with sleep, low energy, worthlessness and/or guilt and difficulty concentrating, thinking or making decisions    Past Episode:  low mood nearly every day, anhedonia most of the time, difficulties with sleep, psychomotor changes (retardation), low energy, worthlessness and/or guilt and difficulty concentrating, thinking or making decisions    B. SUICIDALITY: Current: No, risk Medium  -reports 0 lifetime suicide attempts  -reports \"about zero\" in response to \"How likely are to you to try to kill yourself within the next 3 months on a scale from 0-100%?\"  -denies current SI, denies plan and denies intent  -denies current SIB/Self Injurious Behavior    C. TONIA/HYPOMANIA  Current Episode:  none    Past Episode:  none    D. PANIC:  none    E. AGORAPHOBIA:  none    F. SOCIAL ANXIETY:  none    G. OBSESSIVE-COMPULSIVE:  intrusive thoughts, rumination on \"I'm not good enough\" and related concepts, worries about his \"passivity,\" if something bad is going to happen, and not being functional on most days, if not all    H. TRAUMA:  none    I. ALCOHOL & J. NON-ALCOHOL:  See below    K. PSYCHOSIS:   none    L-M. EATING DISORDER: none    N. GENERALIZED ANXIETY:  none    O. RULE OUT MEDICAL, ORGANIC OR DRUG CAUSES FOR ALL DISORDERS  During any current disorder or past mood episode, patient reports:  A. Substance use or withdrawal: additional assessment necessary  B. Medical illness: No    P. ANTISOCIAL PERSONALITY:  none     Other Cluster B " "Traits Identified (not formally assessed):  none discussed    SUBSTANCE USE HISTORY                                                                 RECENT SUBSTANCE USE:     TOBACCO- smokes and smokeless     CAFFEINE- few cups/day of tea   ALCOHOL- quit in April \"drastically reduced\" the mount he was drinking and three months ago quit drinking     CANNABIS- \"a few times\"            OTHER ILLICIT DRUGS- none    Past Use-   TOBACCO- see above       CAFFEINE- quit drinking coffee, drinks tea   ALCOHOL- used to drink \"a lot\" - eight beers or a bottle and a half of wine/day  CANNABIS- \"a few times\"            OTHER ILLICIT DRUGS- none    CD Treatment Hx: No  Medical Consequences (eg HIV/Hepatitis)- No  Legal Consequences- No     PSYCHIATRIC HISTORY     Past diagnoses: MDD, uncomplicated alcohol dependence, HAILEY, ADHD (diagnosis five years ago)    Past medication trials: multiple trials    Hospitalizations: none reported    Commitment: No, Current [place] order: No    ECT trials: No    TMS trials:  No      Ketamine:  No    Suicide attempts: No    Self-injurious behavior: No    Violent behavior: No    Outpatient Programs & Services [Psychotherapy, DBT, Day Treatment, Eating Disorder Tx etc]:   Current:  Medication management and Outpatient individual psychotherapy    Past:  Medication management, Outpatient individual psychotherapy and Partial hospitalization program (PHP)    SOCIAL and FAMILY HISTORY                                                             Living situation: Harshal lives alone, in a Private Residence.   Guns, weapons, or other means to harm oneself in the home? No  Pets at home? Yes - one cat     Education: Harshal s highest level of education is associate degree / vocational certificate    Occupation: Harshal is currently not working. 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 He has one close friend in the area and a few more casual friends that he gets together with on occasion. He has two daughters, 19 and 22 years old, who he keeps in touch with    Spiritual considerations: Yes - \"some Confucianism\"    Cultural influences: Harshal identifies is race as White. Per reports no cultural considerations to take into account when providing treatment.     Gender identity:  Harshal identifies as male and uses he/him pronouns.    Strengths & Coping Strategies:  Harshal is bright, educated, and is connected to family and friends. There have been times in his life when he has not felt depressed. He is trained as a .    Legal Hx: No    Trauma/Abuse Hx: No     Hx: No    Family Mental Health Hx- father has depression and a number of paternal family members deal with depression    PAST PSYCH MED TRIALS      Will be reviewed during MTM.    MEDICAL / SURGICAL HISTORY                                   Patient Active Problem List   Diagnosis     Uncomplicated alcohol dependence (H)       No past surgical history on file.     History of seizures: no   History of head trauma/loss of consciousness: no     ALLERGY                                Gluten meal, Milk protein extract, and Wheat extract    MEDICATIONS                               Current Outpatient Medications   Medication Sig Dispense Refill     cholecalciferol (VITAMIN D3) 125 mcg (5000 units) capsule Take 125 mcg by mouth daily       gabapentin (NEURONTIN) 300 MG capsule TAKE TWO CAPSULES BY MOUTH THREE TIMES DAILY       LORazepam (ATIVAN) 1 MG tablet TAKE 1 TABLET BY MOUTH 2 TIMES A DAY AS NEEDED FOR ANXIETY OR INSOMNIA.       REXULTI 1 MG tablet Take 1 mg by mouth daily       VITAMIN D PO Take 1 tablet by mouth daily       vortioxetine (TRINTELLIX) 20 MG tablet Take 20 mg by mouth " "daily       loratadine (CLARITIN) 10 MG tablet Take 10 mg by mouth daily (Patient not taking: Reported on 12/15/2022)       multivitamin w/minerals (THERA-VIT-M) tablet Take 1 tablet by mouth daily (Patient not taking: Reported on 12/15/2022) 30 tablet 0     nicotine (NICODERM CQ) 14 MG/24HR 24 hr patch Place 1 patch onto the skin daily (Patient not taking: Reported on 12/15/2022) 30 patch 0     Omega-3 Fatty Acids (FISH OIL OMEGA-3 PO) Take 1 capsule by mouth daily (Patient not taking: Reported on 12/15/2022)       PHENobarbital (LUMINAL) 32.4 MG tablet Take 1 tablet (32.4 mg) by mouth At Bedtime (Patient not taking: Reported on 12/15/2022) 4 tablet 0     Probiotic Product (PROBIOTIC DAILY PO) Take 1 capsule by mouth daily (Patient not taking: Reported on 12/15/2022)       S-Adenosylmethionine (JJ-E PO) Take 1 tablet by mouth daily (Patient not taking: Reported on 12/15/2022)       sertraline (ZOLOFT) 100 MG tablet Take 200 mg by mouth daily (Patient not taking: Reported on 12/15/2022)       THIAMINE HCL PO Take 1 tablet by mouth daily (Patient not taking: Reported on 12/15/2022)         VITALS                                                                                                                            3, 3   Ht 1.753 m (5' 9\")   Wt 77.1 kg (170 lb)   BMI 25.10 kg/m       MENTAL STATUS EXAM                                                                                    9, 14 cog gs     Alertness: oriented and groggy  Appearance: adequately groomed  Behavior/Demeanor: cooperative and calm, with fair  eye contact   Speech: normal  Language: intact  Psychomotor: restless  Mood: depressed  Affect: restricted and flat; was congruent to mood; was congruent to content  Thought Process/Associations: unremarkable  Thought Content:  Reports none;  Denies suicidal and violent ideation, delusions, preoccupations, obsessions , phobia , magical thinking, over-valued ideas and paranoid ideation  Perception:  " Reports none;  Denies auditory hallucinations and visual hallucinations  Insight: good  Judgment: adequate for safety  Cognition: does  appear grossly intact; formal cognitive testing was not done    PSYCHIATRIC DIAGNOSES                                                                                               Major depressive disorder    Generalized anxiety disorder, by history  ADHD, by history     ASSESSMENT                                                                                                          m2, h3     Please note, writer did not receive all pertinent medical records as of the time of this assessment. Gus did not sign OMAYRA's for additional records.     Harshal Brown is a 57 year old   male with a psychiatric history of depression, AUSTYN, ADHD, anxiety who presents for a [facility].Treatment Resistant Depression program evaluation. Harshal was not sure who he was referred by. He has a history of no psychiatric hospitalization(s).  Family mental health history includes depression.     Harshal's most recent episode of depression started about one year ago and first episode of depression started in childhood. His first treatment for depression was 19.    Today, Harshal presents as a Adequate historian with Adequate insight. He estimates at least three lifetime episodes of depression with onset in childhood. Per s past and present depressive symptoms seem consistent with a diagnosis of major depressive disorder. Depressive symptoms seem to contribute to impaired functioning in the areas of family / partner relationships , social relationships, occupational performance and emotional wellbeing . Precipitating factors seem to include early onset of symptoms. Perpetuating factors may consist of alcohol use, limited engagement with therapy. Past treatment approaches include medication, therapy. Harshal is presently participating in individual therapy, medication and a recently competed  PHP at [facility] with interest in ketamine treatment.    In addition, the M.I.N.I. Interview did not score positively for other diagnoses. Harshal reports he was diagnosed with ADHD about five years ago and chart review indicated a pervious diagnosis of HAILEY. History of substance use and recent sobriety may be part of the current depression. Further diagnostic clarification is not needed to rule out diagnosis(es).     Basic needs (food, housing, transportation, safety, income stability): basic needs are met at this moment, but Harshal is not working and has not enrolled in any assistance programs. Writer provided information for [facility] [place] medical transportation.    Today, Harshal denies SI, denies a plan, and denies intent. He has notable risk factors for self-harm including hopelessness, lives alone/ isolated, substance use / pending treatment, financial/legal stress and relationship conflict.  However, risk is mitigated by no h/o suicide attempt, no plan or intent, no access to lethal means and commitment to family.  Based on all available evidence he does not appear to be at imminent risk for self-harm therefore does not meet criteria for a 72-hr hold/  involuntary hospitalization. Additional steps to minimize risk include: discussing safety plan, including people to reach out to, crisis numbers and texts, and Em[facility].  24/7 crisis resources were provided verbally.     PLAN                                                                                                                        m2, h3   Next steps are as follows with intention of completing a comprehensive multi-disciplinary assessment utilizing today's evaluation, the expertise of a PharmD, as well as a Psychiatrist. Informed Per that if deemed appropriate for Interventional Psychiatry treatments, care will be provided with goal of stabilization with subsequent transfer back to the community (I.e. PCP or previous  psychiatrist).    Medications: Will be addressed during an MTM visit and new patient medication evaluation with a Psychiatrist.     Therapy:  Yes - continue with current individual therapist. Could consider an IOP as a step down from PHP. Could also consider a support group, meditation/mindfulness group practice.    Crisis Resources provided:  24/7 crisis resources including but not limited to the following:   - National Suicide Prevention Hotline: 6-147-801-TALK (4484)   - Crisis Text Line: Text START to 292-513   - Em[facility] at [employer]    Other Referrals:  Yes - [facility]    RTC: For MTM visit.    Pat Alarcon, LICSW         _____________________________________  Per is a 57 year old who is being evaluated via a billable video visit.      How would you like to obtain your AVS? MyChart  If the video visit is dropped, the invitation should be resent by: Text to cell phone: [phone]  Will anyone else be joining your video visit? No  CHAR Bowden    Depression Response    Patient completed the PHQ-9 assessment for depression and scored >9? Yes  Question 9 on the PHQ-9 was positive for suicidality? No  Does patient have current mental health provider? Yes    Is this a virtual visit? Yes    I personally notified the following: visit provider               Video-Visit Details    Video Start Time: 9:18    Type of service:  Video Visit    Video End Time:10:34    Originating Location (pt. Location): Home    Distant Location (provider location):  Off-site    Platform used for Video Visit: Latoya

## 2022-12-28 ENCOUNTER — VIRTUAL VISIT (OUTPATIENT)
Dept: PSYCHIATRY | Facility: CLINIC | Age: 57
End: 2022-12-28
Payer: COMMERCIAL

## 2022-12-28 VITALS — HEIGHT: 69 IN | WEIGHT: 170 LBS | BODY MASS INDEX: 25.18 KG/M2

## 2022-12-28 DIAGNOSIS — F33.2 SEVERE RECURRENT MAJOR DEPRESSION WITHOUT PSYCHOTIC FEATURES (H): Primary | ICD-10-CM

## 2022-12-28 ASSESSMENT — PATIENT HEALTH QUESTIONNAIRE - PHQ9
10. IF YOU CHECKED OFF ANY PROBLEMS, HOW DIFFICULT HAVE THESE PROBLEMS MADE IT FOR YOU TO DO YOUR WORK, TAKE CARE OF THINGS AT HOME, OR GET ALONG WITH OTHER PEOPLE: EXTREMELY DIFFICULT
SUM OF ALL RESPONSES TO PHQ QUESTIONS 1-9: 22
SUM OF ALL RESPONSES TO PHQ QUESTIONS 1-9: 22

## 2022-12-28 ASSESSMENT — PAIN SCALES - GENERAL: PAINLEVEL: NO PAIN (0)

## 2022-12-28 NOTE — NURSING NOTE
Patient reviewed medications and allergies in Mychart during e-check in and said everything looked correct.    Patient scored  22 on PHQ-9.     Brenna Mcpherson, VF

## 2022-12-28 NOTE — PROGRESS NOTES
Harshal is a 57 year old who is being evaluated via a billable video visit.      How would you like to obtain your AVS? MyChart  If the video visit is dropped, the invitation should be resent by: Text to cell phone: [phone]  Will anyone else be joining your video visit? No       CHAR Bowden    Depression Response    Patient completed the PHQ-9 assessment for depression and scored >9? Yes  Question 9 on the PHQ-9 was positive for suicidality? Yes  Does patient have current mental health provider? Yes    Is this a virtual visit? Yes   Does patient have suicidal ideation (positive question 9)? Yes (adult) - transfer to [facility] Triage ([phone]) Patient declined transfer.  Notify provider.     I personally notified the following: visit provider               Video-Visit Details    Type of service:  Video Visit   Video Start Time: 9:00 am  Video End Time:9:55 am    Originating Location (pt. Location): Home    Distant Location (provider location):  Off-site  Platform used for Video Visit: Latoya

## 2023-01-04 NOTE — PROGRESS NOTES
Service Date: 2022    VIDEO VISIT      PHYSICIAN TRD PROGRAM MEDICATION THERAPY MANAGEMENT    This was a video visit from my home to the patient's home via [employer] and due to COVID.  We used [employer], and in case we would get disconnected, we would use the patient's [phone].  The patient's e-mail is [email or web address].      Starting time 9:00 a.m.  Ending time 9:50 a.m.    DICTATION IDENTIFIER:  NAME:  Gus Brown.   :  1965  VIDEO VISIT:  2022    The patient is a [facility] patient.      IDENTIFYING INFORMATION AND INTRODUCTION:  Harshal is a 57-year-old male who grew up in [place].  His dad passed away 2-1/2 years ago.  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  He lives in [place], [place], and the patient is a new adult to the [facility] Physician TRD program.    Psychiatrist is Dr. Chela Brian, and she will see the patient in person on 2023, which was communicated to the patient.    CHIEF COMPLAINTS:   1-  Depression:Has a difficult time motivating himself. Fluoxetine and sertraline worked for many years each one. When retried was not working as well and only for short while doses were increased.  2- Anxiety: Patient has history of Benzodiazepine abuse and currently on an lorazepam taper and change to an extended release formulation Lareev XR 1 mg every day.      REASON FOR CONSULTATION:  Rating medication trials for antidepressant failure and assessment of antidepressant drugs and their history.    Informants include the patient and review of past medical records.  Please be aware that at the time of the visit, I was not in the possession of all past medical and mental records.    Time spent was 1-1/2 hours without the patient and 55 minutes with the patient.    MEDICATION INFORMATION:    1.  Current Psychotropic Medications:  a.  Vortioxetine/Trintellix 20 mg  "every a.m.  Indication:  Depression, anxiety.  b.  Rexulti/brexpiprazole 1 mg per day.  Was started 09/2022.  Indication:  Augmentation.  c.  Loreev  mg every a.m.  Started 2 weeks ago. It is for anxiety or insomnia.  The patient's healthcare provider is trying to taper him down from the lorazepam.    d.  Gabapentin 300 mg capsules.  The patient is on 900 mg per day divided as 300 mg a.m. and 600 mg at bedtime.  Indication:  Anxiety.  It might help, also, with some of the restless legs symptoms.  e.  Melatonin 5 mg at bedtime.  Indication:  Sleep.    2.  Concomitant Medications:    a.  Tylenol p.r.n. when the patient has a headache.    3.  Herbal Remedies:    a.  Vitamin D 2000 units once a day.     4.  Drug/Drug Interactions:    a.  Gabapentin and lorazepam may result in respiratory depression.  It should not be a big issue because the patient was on much higher doses of lorazepam, and it is a relatively small dose for the patient, and he has no issues so far.    5.  Drug-Therapy Problem Identified:    a.  The patient's current medication is not controlling his depression enough for him to get back to work and enjoy life.    6.  Current Reported Side Effects:  Yes.  a.  Some restless legs kind of feeling.  According to the patient, he says, \"It's not really restless legs.  It is crawling through my body, especially when it was Rexulti 2 mg, and my legs got really weak, and it was decreased to 1 mg.\"    7.  Gene Testing:  Was never done.    8.  ALLERGIES/SENSITIVITIES:  a.  Gluten.  b.  Milk protein extract.  c.  Wheat extract.     PAST MEDICAL HISTORY:    1.  MDD.  2.  HAILEY.  3.  ADD?  4.  Ulcerative proctitis.  5.  COVID in 01/2022.  6.  Epicondylitis.  7.  Asthma.  8.  History of alcohol and benzodiazepine abuse.  9.  IBS.  10.  Gallstones.  11.  Vitamin D deficiency.    12.  Inguinal hernia.  13.  GERD.  14.  Hemorrhoids.    FAMILY MENTAL HEALTH: Depression on his father's side of family.    DEPRESSION " "HISTORY:    1.  First time experienced:  Teen years. Onset around 1992.    2.  First time antidepressant drug started: The same time in 1992.  The patient thinks it was Prozac.    3.  Last episode started a year ago when sertraline stopped working, and he started a new job.    4.  Hospitalization for severe suicidal ideation or suicide attempt:  He had detox from 03/08/2021-03/10/2021 for alcohol abuse and was afterwards in detox.    5.  Treatments:  The patient was at [facility] partial hospitalization, and he finished a month ago.    6.  Suicidal ideation currently:  Denied.    7.  Individual psychotherapy:  He sees a therapist on a weekly basis.  When I asked him if it was helpful, he said not so much.    8.  Cognitive behavior therapy:  Yes.  Effective:  Not so much.    9.  DBT:  Yes.  \"When I was in it at [facility], it was somewhat helpful, but not now.\"    SOCIAL AND ENVIRONMENTAL ASPECTS:  The patient lives by himself.  He has 2 adult daughters.  The oldest one lives in [place] and the younger one in [place].  He is , and he has a cat.    PHARMACOTHERAPY INDICATORS:    A.  Pharmaceutical Aspects:  1.  Economic Assessment:  The patient has [place] prescription coverage with his insurance plan.  Prescription drug co-payment is between $1-$3 or nothing.      The cost of obtaining prescribed medications does not interfere with compliance.    2.  Pharmacy:  [facility] in [place].    3.  Compliance Assessment:  The patient is independent in medication administration.  He is a relatively good historian.  He does not use a pillbox currently, but he has in the past.    He misses medication rarely.    When I ask him what happens when the depression gets really severe, what do you do, he says, \"I talk to a friend over the phone.  I sleep and do not reach out.\"    B.  Pharmacokinetic Aspects  4.  Habits and Chemical Use:  a.  Alcohol:  History of dependence.  He has used very little since April, and the last use was 3 months ago.  " He said he always would drink beer or wine.    b.  Tobacco:  He smokes 15 cigarettes a day.  c.  Caffeine:  He drinks black tea in the morning.  d.  Recreational drugs:  He says he smoked weed on occasions, but not heavily.  e.  Exercise:  Not really.  f.  Hobbies:  In the past, he was playing the [employer].  He loves music.  He is a creative person.  He sings.  He writes songs.  In the past, he was a quite successful  in [place] and currently is not doing any of those things.    RATING OF ANTIDEPRESSANT DRUGS:  Antidepressant treatment history using antidepressant resistant rating.    1.  Selective serotonin reuptake inhibitors (SSRIs):    a.  Citalopram/Celexa:  According to the patient, it worked for a while.  b.  Fluoxetine/Prozac was the first drug ever tried and was retried in 05/2022, and it worked for a while. In the past, it worked for quite a long time, and then it stopped working.  c.  Sertraline/Zoloft was used many, many years and was good with no side effects. It stopped working.  Max dose I saw was 200 mg/d, so it would get a rating of 4.    2.  Serotonin norepinephrine reuptake inhibitors (SNRIs):    a.  Venlafaxine/Effexor was tried, and the patient said it was terrible.  It sent him off the wall.    3.  Serotonin modulators and stimulators:  a.  Vortioxetine/Trintellix: From 06/2022 to present.  Max dose 20 mg per day.  He got more agitated, but it helped somewhat with the depression.  The dosing would give it a rating of 4.  It was used with and without augmentation.     4.  Norepinephrine and dopamine reuptake inhibitors (NDRIs):  a.  Bupropion/Wellbutrin was tried in 06/2021, 150 mg per day.    5.  Tricyclic antidepressants (TCAs):  Negative.    6.  Tetracyclic antidepressants:    a.  Trazodone/Desyrel was tried a long time ago, and he got too groggy.    7.  Monoamine oxidase inhibitors (MAOIs):  Negative.    8.  Augmentation therapy:      9.  Miscellaneous augmentation  therapy.  - Antipsychotics:    a.  Aripiprazole/Abilify:  Max dose 2 mg per day in 2022. Boosted sertraline effect for a while.    b.  Brexpiprazole Rexulti: 2 mg in 09/2022.  Helped slightly, but caused restlessness, and the dose had to be decreased to 1 mg because of his strange feeling in his legs.  c.  Olanzapine/Zyprexa:  Max dose 5 mg per day, 07/2022.  Ineffective.    - Stimulants:    a.  Dextroamphetamine/amphetamine/Adderall:  Three years ago, it helped focusing and staying on task.  He got tightness in his jaw and was on it maybe for 9 months.    b.  Lisdexamfetamine/Vyvanse:  Only for a very short time.  It could have been that it was too expensive to get or something.      - Benzodiazepines:    a.  Diazepam was tried once.  b.  Lorazepam: 2 mg off and on for quite a long time and currently is on an extended-release formulation, 1 mg.      10.  Miscellaneous:    a.  Gabapentin/Neurontin:  Max dose 1800 mg per day. Uses it currently.  b.  Omega 3/triglyceride/fish oil was tried.  c.  Hydroxyzine/Atarax: In 2021, 25 mg was tried.  d.  JJ-e: Once a day.  Was helpful.  He ran out of it 6 months ago, and he stated it was helpful for him.  e.  Phenobarbital: 32.4 mg at bedtime, 4 tablets in 03/2021 when he was brought in with alcohol intoxication, so I assume it was given to prevent withdrawal seizures.  f.  NicoDerm: Different kinds of patches were used when he was in detox.      11.  Miscellaneous sleep aids:    a.  Ambien/zolpidem was tried.  b.  Melatonin: 5 mg was tried.  c.  Gabapentin/Neurontin was tried.  d.  Trazodone was tried.    12.  Ketamine treatment history:  Negative.    13.  Other reported treatments for depression and related mood disorders:    A. TMS:  No, but it appears that the patient had more than 4 antidepressant drug trials from more than 4 different drug groups with optimization in the medication without complete improvement, so TMS could be an option if Dr. Brian thinks it is the  right thing for the patient.  b.  ECT:  Negative.  c.  VNS:  Negative.  d.  Bright lights: In the past might have been helpful.  e. CPAP:  Negative.    COMMENTS:    1.  The patient has criteria for TMS. He had more than 4 different antidepressant medications from 4 different groups even.  2.  The patient will follow up with MTM as needed.  3.  All MTM findings will be shared with clinic psychiatrist.  4.  The patient was instructed to return for upcoming appointment with Dr. Chela Brian in clinic for recommendation and future treatment options.    Thank you for the opportunity to participate in the care of this patient.    Koki Blackwell, PharmD  Pharmaceutical Care Coordinator    Koki Blackwell, Alessio        D: 2023   T: 2023   MT: norman    Name:     CARSON ASCENCIO  MRN:      [id number]        Account:      [id number]   :      1965           Service Date: 2022       Document: [id number]

## 2023-01-11 ENCOUNTER — OFFICE VISIT (OUTPATIENT)
Dept: PSYCHIATRY | Facility: CLINIC | Age: 58
End: 2023-01-11
Payer: COMMERCIAL

## 2023-01-11 VITALS
WEIGHT: 170 LBS | SYSTOLIC BLOOD PRESSURE: 123 MMHG | HEIGHT: 69 IN | BODY MASS INDEX: 25.18 KG/M2 | DIASTOLIC BLOOD PRESSURE: 77 MMHG | HEART RATE: 61 BPM | TEMPERATURE: 98.1 F

## 2023-01-11 DIAGNOSIS — G47.00 INSOMNIA, UNSPECIFIED TYPE: ICD-10-CM

## 2023-01-11 DIAGNOSIS — F33.2 SEVERE EPISODE OF RECURRENT MAJOR DEPRESSIVE DISORDER, WITHOUT PSYCHOTIC FEATURES (H): Primary | ICD-10-CM

## 2023-01-11 DIAGNOSIS — R53.82 CHRONIC FATIGUE: ICD-10-CM

## 2023-01-11 DIAGNOSIS — F41.1 GENERALIZED ANXIETY DISORDER: ICD-10-CM

## 2023-01-11 RX ORDER — ALPRAZOLAM 1 MG
TABLET ORAL
COMMUNITY
Start: 2022-12-29

## 2023-01-11 ASSESSMENT — PATIENT HEALTH QUESTIONNAIRE - PHQ9: SUM OF ALL RESPONSES TO PHQ QUESTIONS 1-9: 22

## 2023-01-11 NOTE — PROGRESS NOTES
" [facility] TMS [facility]  [address], [address]  [place] [address]  New Patient Evaluation             Chief Complaint                                                                                                     Depression     History of Present Illness                                                                                   Per Harshal Brown is a 58 year old man with a long-standing history of depression (started in his teens with flares coincide with important life events and grief), which is severely persistent despite multiple treatment efforts. The patient is referred for further evaluation of the psychiatric symptoms and discussion of advanced therapeutic options.     Psychiatric Review of Symptoms:   Depression: Positive for depressive symptoms including sadness, irritability, anhedonia, social isolation, fluctuations of appetite and sleep, psychomotor retardation, fatigue, feeling worthless, guilt, poor concentration, indecisiveness, passive thoughts of death. The patient reports no thoughts of self-harm or harm to others when asked about explicitly.   Anxiety: Positive for symptoms of anxiety including panic attacks (with symptoms such as  racing heart, sweating, shaking, shortness of breath, choking, chest pain, fear of losing control) along with negative ruminations (\"I'm not good enough\" \"something bad is going to happen\"), generalized worry, restlessness, fatigue, poor concentration, irritability, muscle tension and insomnia   PTSD: Negative  Irta: Negative  Psychosis: Negative   Eating Disorders: Negative  Other: Reports \"electromagnetic sensitivity\" (describes as having headache by standing next to a microwave or \"a strong Wi-Fi generator\",  generalized body pain upon carrying his cellular phone longer than usual)     Psychiatric History     Current psychotherapist- Alyx Gomez  Current psychiatric prescriber- Bertin Rivera at [facility]     Hospitalizations: " "none reported     Commitment: No, Current [place] order: No     ECT trials: No     TMS trials:  No       Ketamine:  No     Suicide attempts: No     Self-injurious behavior: No     Violent behavior: No     Outpatient Programs & Services [Psychotherapy, DBT, Day Treatment, Eating Disorder Tx etc]:   Current:  Medication management and Outpatient individual psychotherapy     Past:  Medication management, Outpatient individual psychotherapy and Partial hospitalization program (PHP)    RECENT SUBSTANCE USE:     TOBACCO- smokes and smokeless     CAFFEINE- few cups/day of tea   ALCOHOL- quit in April \"drastically reduced\" the mount he was drinking and three months ago quit drinking     CANNABIS- \"a few times\"            OTHER ILLICIT DRUGS- none     Past Use-   TOBACCO- see above       CAFFEINE- quit drinking coffee, drinks tea   ALCOHOL- used to drink \"a lot\" - eight beers or a bottle and a half of wine/day  CANNABIS- \"a few times\"            OTHER ILLICIT DRUGS- none     CD Treatment Hx: No  Medical Consequences (eg HIV/Hepatitis)- No  Legal Consequences- No       Psychiatric Medication Trials   As per Koki Blackwell, PharmD's comprehensive medication review:    \"2.  Concomitant Medications:    a.  Tylenol p.r.n. when the patient has a headache.     3.  Herbal Remedies:    a.  Vitamin D 2000 units once a day.      4.  Drug/Drug Interactions:    a.  Gabapentin and lorazepam may result in respiratory depression.  It should not be a big issue because the patient was on much higher doses of lorazepam, and it is a relatively small dose for the patient, and he has no issues so far.     5.  Drug-Therapy Problem Identified:    a.  The patient's current medication is not controlling his depression enough for him to get back to work and enjoy life.     6.  Current Reported Side Effects:  Yes.  a.  Some restless legs kind of feeling.  According to the patient, he says, \"It's not really restless legs.  It is crawling through my " "body, especially when it was Rexulti 2 mg, and my legs got really weak, and it was decreased to 1 mg.\"     7.  Gene Testing:  Was never done.     8.  ALLERGIES/SENSITIVITIES:  a.  Gluten.  b.  Milk protein extract.  c.  Wheat extract.      PAST MEDICAL HISTORY:    1.  MDD.  2.  HAILEY.  3.  ADD?  4.  Ulcerative proctitis.  5.  COVID in 01/2022.  6.  Epicondylitis.  7.  Asthma.  8.  History of alcohol and benzodiazepine abuse.  9.  IBS.  10.  Gallstones.  11.  Vitamin D deficiency.    12.  Inguinal hernia.  13.  GERD.  14.  Hemorrhoids.     FAMILY MENTAL HEALTH: Depression on his father's side of family.     DEPRESSION HISTORY:    1.  First time experienced:  Teen years. Onset around 1992.     2.  First time antidepressant drug started: The same time in 1992.  The patient thinks it was Prozac.     3.  Last episode started a year ago when sertraline stopped working, and he started a new job.     4.  Hospitalization for severe suicidal ideation or suicide attempt:  He had detox from 03/08/2021-03/10/2021 for alcohol abuse and was afterwards in detox.     5.  Treatments:  The patient was at [facility] partial hospitalization, and he finished a month ago.     6.  Suicidal ideation currently:  Denied.     7.  Individual psychotherapy:  He sees a therapist on a weekly basis.  When I asked him if it was helpful, he said not so much.     8.  Cognitive behavior therapy:  Yes.  Effective:  Not so much.     9.  DBT:  Yes.  \"When I was in it at [facility], it was somewhat helpful, but not now.\"     SOCIAL AND ENVIRONMENTAL ASPECTS:  The patient lives by himself.  He has 2 adult daughters.  The oldest one lives in [place] and the younger one in [place].  He is , and he has a cat.     PHARMACOTHERAPY INDICATORS:    A.  Pharmaceutical Aspects:  1.  Economic Assessment:  The patient has [place] prescription coverage with his insurance plan.  Prescription drug co-payment is between $1-$3 or nothing.       The cost of obtaining prescribed " "medications does not interfere with compliance.     2.  Pharmacy:  [facility] in [place].     3.  Compliance Assessment:  The patient is independent in medication administration.  He is a relatively good historian.  He does not use a pillbox currently, but he has in the past.     He misses medication rarely.     When I ask him what happens when the depression gets really severe, what do you do, he says, \"I talk to a friend over the phone.  I sleep and do not reach out.\"     B.  Pharmacokinetic Aspects  4.  Habits and Chemical Use:  a.  Alcohol:  History of dependence.  He has used very little since April, and the last use was 3 months ago.  He said he always would drink beer or wine.    b.  Tobacco:  He smokes 15 cigarettes a day.  c.  Caffeine:  He drinks black tea in the morning.  d.  Recreational drugs:  He says he smoked weed on occasions, but not heavily.  e.  Exercise:  Not really.  f.  Hobbies:  In the past, he was playing the [employer].  He loves music.  He is a creative person.  He sings.  He writes songs.  In the past, he was a quite successful  in [place] and currently is not doing any of those things.     RATING OF ANTIDEPRESSANT DRUGS:  Antidepressant treatment history using antidepressant resistant rating.     1.  Selective serotonin reuptake inhibitors (SSRIs):    a.  Citalopram/Celexa:  According to the patient, it worked for a while.  b.  Fluoxetine/Prozac was the first drug ever tried and was retried in 05/2022, and it worked for a while. In the past, it worked for quite a long time, and then it stopped working.  c.  Sertraline/Zoloft was used many, many years and was good with no side effects. It stopped working.  Max dose I saw was 200 mg/d, so it would get a rating of 4.     2.  Serotonin norepinephrine reuptake inhibitors (SNRIs):    a.  Venlafaxine/Effexor was tried, and the patient said it was terrible.  It sent him off the wall.     3.  Serotonin modulators and stimulators:  a.  " Vortioxetine/Trintellix: From 06/2022 to present.  Max dose 20 mg per day.  He got more agitated, but it helped somewhat with the depression.  The dosing would give it a rating of 4.  It was used with and without augmentation.      4.  Norepinephrine and dopamine reuptake inhibitors (NDRIs):  a.  Bupropion/Wellbutrin was tried in 06/2021, 150 mg per day.     5.  Tricyclic antidepressants (TCAs):  Negative.     6.  Tetracyclic antidepressants:    a.  Trazodone/Desyrel was tried a long time ago, and he got too groggy.     7.  Monoamine oxidase inhibitors (MAOIs):  Negative.     8.  Augmentation therapy:       9.  Miscellaneous augmentation therapy.  - Antipsychotics:    a.  Aripiprazole/Abilify:  Max dose 2 mg per day in 2022. Boosted sertraline effect for a while.    b.  Brexpiprazole Rexulti: 2 mg in 09/2022.  Helped slightly, but caused restlessness, and the dose had to be decreased to 1 mg because of his strange feeling in his legs.  c.  Olanzapine/Zyprexa:  Max dose 5 mg per day, 07/2022.  Ineffective.     - Stimulants:    a.  Dextroamphetamine/amphetamine/Adderall:  Three years ago, it helped focusing and staying on task.  He got tightness in his jaw and was on it maybe for 9 months.    b.  Lisdexamfetamine/Vyvanse:  Only for a very short time.  It could have been that it was too expensive to get or something.       - Benzodiazepines:    a.  Diazepam was tried once.  b.  Lorazepam: 2 mg off and on for quite a long time and currently is on an extended-release formulation, 1 mg.       10.  Miscellaneous:    a.  Gabapentin/Neurontin:  Max dose 1800 mg per day. Uses it currently.  b.  Omega 3/triglyceride/fish oil was tried.  c.  Hydroxyzine/Atarax: In 2021, 25 mg was tried.  d.  JJ-e: Once a day.  Was helpful.  He ran out of it 6 months ago, and he stated it was helpful for him.  e.  Phenobarbital: 32.4 mg at bedtime, 4 tablets in 03/2021 when he was brought in with alcohol intoxication, so I assume it was given  "to prevent withdrawal seizures.  f.  NicoDerm: Different kinds of patches were used when he was in detox.       11.  Miscellaneous sleep aids:    a.  Ambien/zolpidem was tried.  b.  Melatonin: 5 mg was tried.  c.  Gabapentin/Neurontin was tried.  d.  Trazodone was tried.     12.  Ketamine treatment history:  Negative.     13.  Other reported treatments for depression and related mood disorders:    A. TMS:  No, but it appears that the patient had more than 4 antidepressant drug trials from more than 4 different drug groups with optimization in the medication without complete improvement, so TMS could be an option if Dr. Brian thinks it is the right thing for the patient.  b.  ECT:  Negative.  c.  VNS:  Negative.  d.  Bright lights: In the past might have been helpful.  e. CPAP:  Negative.\"        Social/ Family History                      Medical / Surgical History     Patient Active Problem List   Diagnosis     Uncomplicated alcohol dependence (H)       History reviewed. No pertinent surgical history.      Medical Review of Systems                                                                                                      GENERAL: Chronic fatigue, otherwise negative for acute malaise, significant weight loss and fever  HEENT: No changes in hearing or vision, no nose bleeds or other nasal problems and Negative for frequent or significant headaches  NECK: Negative for lumps, goiter, pain and significant neck swelling  RESPIRATORY: Chronic intermittent shortness of breath. Negative for cough, wheezing   CARDIOVASCULAR: Negative for chest pain, leg swelling and palpitations  GI: Negative for abdominal discomfort, blood in stools or black stools and change in bowel habits  : Negative for dysuria, frequency and incontinence   MUSCULOSKELETAL: Negative for joint pain or swelling, back pain, and muscle pain.  SKIN: Negative for lesions, rash, and itching.  PSYCH: See HPI  HEMATOLOGY/LYMPHOLOGY Negative for " prolonged bleeding, bruising easily, and swollen nodes.  ENDOCRINE: Negative for cold or heat intolerance, polyuria, polydipsia and goiter.  NEURO:  The patient denies any symptoms of neurological impairment or TIA's; no amaurosis, diplopia, dysphasia, or unilateral disturbance of motor or sensory function. No loss of balance or vertigo.          Metals Screen     Yes No Item     X Implanted or lodged metals in body     X Implanted surgical devices     X Metal containing facial or scalp tattoos     X Non removable piercings   Seizure Screen  Yes No Item     X Current Seizure Disorder?     X History of Seizure? (questioned particularly for alcohol withdrawal seizures)     Does patient have a cochlear implant? ___No_____  Does patient have any shunts?___No______  Does patient have a pacemaker?___No______  Does patient have a vagus nerve stimulator?___No______  Does patient have a deep brain stimulator?___No______  Any other implanted device?___No________       Allergy   Gluten meal, Milk protein extract, and Wheat extract     Current Medications     Current Outpatient Medications   Medication Sig     ALPRAZolam (XANAX) 1 MG tablet Take 1/2 - 1 tablet by mouth 3 times daily     cholecalciferol (VITAMIN D3) 125 mcg (5000 units) capsule Take 125 mcg by mouth daily     gabapentin (NEURONTIN) 300 MG capsule TAKE TWO CAPSULES BY MOUTH THREE TIMES DAILY     REXULTI 1 MG tablet Take 1 mg by mouth daily     VITAMIN D PO Take 1 tablet by mouth daily     vortioxetine (TRINTELLIX) 20 MG tablet Take 20 mg by mouth daily     loratadine (CLARITIN) 10 MG tablet Take 10 mg by mouth daily (Patient not taking: Reported on 12/15/2022)     LORazepam (ATIVAN) 1 MG tablet TAKE 1 TABLET BY MOUTH 2 TIMES A DAY AS NEEDED FOR ANXIETY OR INSOMNIA. (Patient not taking: Reported on 1/11/2023)     multivitamin w/minerals (THERA-VIT-M) tablet Take 1 tablet by mouth daily (Patient not taking: Reported on 12/15/2022)     nicotine (NICODERM CQ) 14  "MG/24HR 24 hr patch Place 1 patch onto the skin daily (Patient not taking: Reported on 12/15/2022)     Omega-3 Fatty Acids (FISH OIL OMEGA-3 PO) Take 1 capsule by mouth daily (Patient not taking: Reported on 12/15/2022)     PHENobarbital (LUMINAL) 32.4 MG tablet Take 1 tablet (32.4 mg) by mouth At Bedtime (Patient not taking: Reported on 12/15/2022)     Probiotic Product (PROBIOTIC DAILY PO) Take 1 capsule by mouth daily (Patient not taking: Reported on 12/15/2022)     S-Adenosylmethionine (JJ-E PO) Take 1 tablet by mouth daily (Patient not taking: Reported on 12/15/2022)     sertraline (ZOLOFT) 100 MG tablet Take 200 mg by mouth daily (Patient not taking: Reported on 12/15/2022)     THIAMINE HCL PO Take 1 tablet by mouth daily (Patient not taking: Reported on 12/15/2022)     No current facility-administered medications for this visit.        Vitals                                                                                                                             /77   Pulse 61   Temp 98.1  F (36.7  C) (Temporal)   Ht 1.753 m (5' 9\")   Wt 77.1 kg (170 lb)   BMI 25.10 kg/m        Mental Status Exam                                                                                        Appearance/ Behavior: In appropriate, casual attire; has good hygiene. Calmly and actively engages with the examiner. Maintains good eye contact.   Speech:  Clear, spontaneous with no apparent receptive or expressive difficulties. Normal rate, rhythm and volume.  Musculoskeletal:  Normal bulk with no visible atrophy.  No abnormal movements noted.  Anxious voluntary movements of the right leg. Gait is of normal base, stride and speed. Normal arm swing bilaterally.  Mood/Affect: Mood is reported as \"depressed\".  Affect is restricted to depressed range with occasional appropriate brightening.  Thought Process:  Mostly linear with occasional circumstantiality which responds to redirection  Thought Content: No evidence for " thoughts of self harm or harm to others   Perception:  No evidence for any perceptual disturbances  Cognition: alert, fully oriented to person, place and time.  Appropriate fund of knowledge.   Judgment/Insight: Fair insight regarding the multifactorial nature of emotional dysregulation and need for care. Judgment is reasonable within the context of insight.       Labs and Data     Rating Scales:      PHQ9 Today:    PHQ 12/15/2022 12/28/2022 1/11/2023   PHQ-9 Total Score 18 22 22   Q9: Thoughts of better off dead/self-harm past 2 weeks Not at all Not at all Not at all         Recent Labs   Lab Test 03/08/21  1801   CR 0.86   GFRESTIMATED >90     Recent Labs   Lab Test 03/08/21  1801   AST 28   ALT 38   ALKPHOS 100         Assessment   / Plan                                                                               Per Harshal Stephanie is a 58 year old  with a difficult-to-treat depression, who is referred to discuss advanced therapeutic options. I'd formulate this presentation as unipolar depression perpetuated by generalized anxiety and cluster C personality matrix, further complicated by substance use and complex psychosocial load.     Prognosis appears constrained by multifactorial nature of the presentation (To name a few: negative cognitive framing, compromised sense of responsibility, poor self respect/acceptance/compassion lowering the threshold for the need for external validation and reassurance, limited sense of connectedness ...). There seems to be a considerable room for improvement in cognitive, affective, behavioral and social patterns along with existential aspects of personal fulfillment. Structured, evidence-based, time-limited cognitive behavioral therapy would be of help.         He has a well documented failure of adequate trials of >= 4 antidepressants which represent multiple antidepressant classes as well as augmentation therapies. The patient has completed an adequate dose of individual  psychotherapy.     Patient is burdened by his chronic symptoms of depression and his current episode has lasted over 18 months causing significant psychosocial dysfunction despite multiple trials of psychotropic medications and individual therapy. Due to remaining profound depression and numerous failed previous treatment modalities, the patient is a candidate for rTMS or ECT.    The risks, benefits, alternatives and potential adverse effects have been explained and are understood by the patient.    Suicide Risk Assessment:  Today Per Harshal Brown reports no thought/plan or intent for self-harm and does not appear to be at imminent risk for self-harm at the time of this visit.      Clinical Global Impression, Severity of Illness (1-7): 6  Clinical Global Impression, Improvement (1-7): N/A      PLAN:  - We have reviewed neuromodulation options: ECT, focal ECT vs rTMS. He is not interested in ECT or rTMS at this point, mainly due to concerns for perceived electromagnetic sensitivity. He agrees to further explore and call back if he changes his mind. He is interested in ketamine and agrees to seek this elsewhere as I'd recommend complete medication optimization (below) along with cognitive behavioral and lifestyle modifications before ketamine, particularly considering history of alcohol use disorder and benzodiazepine misuse.  - Medication optimization:  Considering the level of comorbid anxiety and negative rumination I'd recommend to cross titrate to a TCA, such as clomipramine (goal dose of 100-150mg) or nortriptyline (goal dose of 100-150mg). Lithium can be used in augmentation at any point. If does not respond to TCA, than MAOIs can be tried. Dopaminergic tone can be increased by pramipexole as well.  It would be important to taper off of benzodiazepines and other GABAergic agents, such as gabapentin, longitudinally.     - Continue individual psychotherapy. I would strongly recommend  for structured,  evidence-based, time-limited cognitive behavioral therapy. We reviewed what to expect from evidence-based structured cognitive behavioral therapy.  - Lifestyle modifications: We reviewed how micro-habit optimization can help with overall wellbeing and achieving sustainable benefit from pharmaceutical and procedural interventions.  - Sleep optimization: Sleep regulation is of paramount importance.Cognitive behavioral therapy for insomnia (CBT- I) is a highly effective method. I'd recommend to start with this:  [email or web address]   We have reviewed currently there is no pharmaceutical substitution for natural restful sleep; however she can try adding melatonin in XL form titrating up to 10mg at night   - He is to follow-up with his primary psychiatrist.      CRISIS NUMBERS:   Provided routinely in AVS.    Treatment Risk Statement:  The patient understands the risks, benefits, adverse effects and alternatives. Agrees to treatment with the capacity to do so. No medical contraindications to treatment. Agrees to call clinic for any problems. The patient understands to call 911 or go to the nearest ED if life threatening or urgent symptoms occur.     PSYCHIATRIC ,and other relevant, DIAGNOSES   - Major Depressive Disorder, recurrent, severe, without psychotic features   - Generalized Anxiety Disorder with elements of panic  - Insomnia         PROVIDER:  Chela Rodriguez M.D.

## 2023-04-23 ENCOUNTER — HEALTH MAINTENANCE LETTER (OUTPATIENT)
Age: 58
End: 2023-04-23

## 2023-07-16 ENCOUNTER — HEALTH MAINTENANCE LETTER (OUTPATIENT)
Age: 58
End: 2023-07-16

## 2024-09-08 ENCOUNTER — HEALTH MAINTENANCE LETTER (OUTPATIENT)
Age: 59
End: 2024-09-08